# Patient Record
Sex: FEMALE | Race: WHITE | Employment: OTHER | URBAN - METROPOLITAN AREA
[De-identification: names, ages, dates, MRNs, and addresses within clinical notes are randomized per-mention and may not be internally consistent; named-entity substitution may affect disease eponyms.]

---

## 2019-08-16 ENCOUNTER — HOSPITAL ENCOUNTER (OUTPATIENT)
Dept: MRI IMAGING | Age: 59
Discharge: HOME | End: 2019-08-16
Attending: FAMILY MEDICINE
Payer: COMMERCIAL

## 2019-08-16 DIAGNOSIS — R41.89 THOUGHT DISORDER: ICD-10-CM

## 2019-08-16 DIAGNOSIS — F22 DELUSIONS (HCC): ICD-10-CM

## 2019-08-16 PROCEDURE — 70551 MRI BRAIN STEM W/O DYE: CPT

## 2019-08-21 ENCOUNTER — HOSPITAL ENCOUNTER (OUTPATIENT)
Dept: MAMMOGRAPHY | Age: 59
Discharge: HOME | End: 2019-08-21
Attending: FAMILY MEDICINE
Payer: COMMERCIAL

## 2019-08-21 DIAGNOSIS — Z12.39 ENCOUNTER FOR SCREENING FOR MALIGNANT NEOPLASM OF BREAST: ICD-10-CM

## 2019-08-21 PROCEDURE — 77067 SCR MAMMO BI INCL CAD: CPT

## 2019-09-17 ENCOUNTER — HOSPITAL ENCOUNTER (OUTPATIENT)
Dept: MAMMOGRAPHY | Age: 59
Discharge: HOME | End: 2019-09-17
Attending: FAMILY MEDICINE
Payer: COMMERCIAL

## 2019-09-17 DIAGNOSIS — R92.8 ABNORMAL SCREENING MAMMOGRAM: ICD-10-CM

## 2019-09-17 PROCEDURE — 77066 DX MAMMO INCL CAD BI: CPT

## 2019-09-25 ENCOUNTER — HOSPITAL ENCOUNTER (OUTPATIENT)
Dept: MAMMOGRAPHY | Age: 59
Discharge: HOME | End: 2019-09-25
Attending: FAMILY MEDICINE
Payer: COMMERCIAL

## 2019-09-25 DIAGNOSIS — R92.1 BREAST CALCIFICATION, RIGHT: ICD-10-CM

## 2019-09-25 DIAGNOSIS — R92.1 BREAST CALCIFICATION, LEFT: ICD-10-CM

## 2019-09-25 PROCEDURE — 74011000250 HC RX REV CODE- 250: Performed by: FAMILY MEDICINE

## 2019-09-25 PROCEDURE — 19081 BX BREAST 1ST LESION STRTCTC: CPT

## 2019-09-25 PROCEDURE — 74011250636 HC RX REV CODE- 250/636: Performed by: FAMILY MEDICINE

## 2019-09-25 PROCEDURE — 88305 TISSUE EXAM BY PATHOLOGIST: CPT

## 2019-09-25 PROCEDURE — 77066 DX MAMMO INCL CAD BI: CPT

## 2019-09-25 RX ORDER — LIDOCAINE HYDROCHLORIDE 10 MG/ML
5 INJECTION INFILTRATION; PERINEURAL
Status: COMPLETED | OUTPATIENT
Start: 2019-09-25 | End: 2019-09-25

## 2019-09-25 RX ORDER — LIDOCAINE HYDROCHLORIDE AND EPINEPHRINE 10; 10 MG/ML; UG/ML
5 INJECTION, SOLUTION INFILTRATION; PERINEURAL
Status: COMPLETED | OUTPATIENT
Start: 2019-09-25 | End: 2019-09-25

## 2019-09-25 RX ADMIN — LIDOCAINE HYDROCHLORIDE AND EPINEPHRINE 5 ML: 10; 10 INJECTION, SOLUTION INFILTRATION; PERINEURAL at 12:42

## 2019-09-25 RX ADMIN — SODIUM CHLORIDE 250 ML: 900 INJECTION, SOLUTION INTRAVENOUS at 12:40

## 2019-09-25 RX ADMIN — LIDOCAINE HYDROCHLORIDE 1 ML: 10 INJECTION, SOLUTION INFILTRATION; PERINEURAL at 12:43

## 2019-09-25 RX ADMIN — SODIUM CHLORIDE 250 ML: 900 INJECTION, SOLUTION INTRAVENOUS at 12:42

## 2019-09-25 RX ADMIN — LIDOCAINE HYDROCHLORIDE AND EPINEPHRINE 5 ML: 10; 10 INJECTION, SOLUTION INFILTRATION; PERINEURAL at 12:41

## 2019-09-25 RX ADMIN — LIDOCAINE HYDROCHLORIDE 1 ML: 10 INJECTION, SOLUTION INFILTRATION; PERINEURAL at 12:39

## 2019-09-27 NOTE — PROGRESS NOTES
The patient and her  returned to the breast center to discuss the results from her recent bilateral breast biopsies, the pathology came back as RT & LT ADH. The patient will  Have a bilateral breast MRI and then follow up with Dr. Mandi Benitez on [id number]. The patient did receive a packet of information that includes her pathology, her appointments and the contatc information in case the patient has any further questions.

## 2019-10-02 ENCOUNTER — HOSPITAL ENCOUNTER (OUTPATIENT)
Dept: MRI IMAGING | Age: 59
Discharge: HOME | End: 2019-10-02
Attending: FAMILY MEDICINE
Payer: COMMERCIAL

## 2019-10-02 DIAGNOSIS — N60.99 ATYPICAL HYPERPLASIA OF BREAST: ICD-10-CM

## 2019-10-02 PROCEDURE — 74011250636 HC RX REV CODE- 250/636: Performed by: FAMILY MEDICINE

## 2019-10-02 PROCEDURE — 77049 MRI BREAST C-+ W/CAD BI: CPT

## 2019-10-02 PROCEDURE — 74011000258 HC RX REV CODE- 258: Performed by: FAMILY MEDICINE

## 2019-10-02 PROCEDURE — A9575 INJ GADOTERATE MEGLUMI 0.1ML: HCPCS | Performed by: FAMILY MEDICINE

## 2019-10-02 RX ORDER — GADOTERATE MEGLUMINE 376.9 MG/ML
13 INJECTION INTRAVENOUS
Status: COMPLETED | OUTPATIENT
Start: 2019-10-02 | End: 2019-10-02

## 2019-10-02 RX ORDER — SODIUM CHLORIDE 0.9 % (FLUSH) 0.9 %
10 SYRINGE (ML) INJECTION
Status: COMPLETED | OUTPATIENT
Start: 2019-10-02 | End: 2019-10-02

## 2019-10-02 RX ADMIN — SODIUM CHLORIDE 100 ML: 900 INJECTION, SOLUTION INTRAVENOUS at 09:54

## 2019-10-02 RX ADMIN — GADOTERATE MEGLUMINE 13 ML: 376.9 INJECTION INTRAVENOUS at 09:54

## 2019-10-02 RX ADMIN — Medication 10 ML: at 09:54

## 2019-10-30 ENCOUNTER — HOSPITAL ENCOUNTER (OUTPATIENT)
Dept: LAB | Age: 59
Discharge: HOME | End: 2019-10-30
Payer: COMMERCIAL

## 2019-10-30 DIAGNOSIS — N60.99 ATYPICAL HYPERPLASIA OF BREAST: ICD-10-CM

## 2019-10-30 LAB
ALBUMIN SERPL-MCNC: 4.2 G/DL (ref 3.2–4.6)
ALBUMIN/GLOB SERPL: 0.8 {RATIO} (ref 1.2–3.5)
ALP SERPL-CCNC: 77 U/L (ref 50–136)
ALT SERPL-CCNC: 57 U/L (ref 12–65)
ANION GAP SERPL CALC-SCNC: 8 MMOL/L (ref 7–16)
AST SERPL-CCNC: 51 U/L (ref 15–37)
BASOPHILS # BLD: 0 K/UL (ref 0–0.2)
BASOPHILS NFR BLD: 1 % (ref 0–2)
BILIRUB SERPL-MCNC: 0.4 MG/DL (ref 0.2–1.1)
BUN SERPL-MCNC: 5 MG/DL (ref 8–23)
CALCIUM SERPL-MCNC: 9.2 MG/DL (ref 8.3–10.4)
CHLORIDE SERPL-SCNC: 105 MMOL/L (ref 98–107)
CO2 SERPL-SCNC: 27 MMOL/L (ref 21–32)
CREAT SERPL-MCNC: 0.79 MG/DL (ref 0.6–1)
DIFFERENTIAL METHOD BLD: ABNORMAL
EOSINOPHIL # BLD: 0.1 K/UL (ref 0–0.8)
EOSINOPHIL NFR BLD: 1 % (ref 0.5–7.8)
ERYTHROCYTE [DISTWIDTH] IN BLOOD BY AUTOMATED COUNT: 15 % (ref 11.9–14.6)
GLOBULIN SER CALC-MCNC: 5.4 G/DL (ref 2.3–3.5)
GLUCOSE SERPL-MCNC: 70 MG/DL (ref 65–100)
HCT VFR BLD AUTO: 40.8 % (ref 35.8–46.3)
HGB BLD-MCNC: 13.5 G/DL (ref 11.7–15.4)
IMM GRANULOCYTES # BLD AUTO: 0 K/UL (ref 0–0.5)
IMM GRANULOCYTES NFR BLD AUTO: 0 % (ref 0–5)
LYMPHOCYTES # BLD: 3.4 K/UL (ref 0.5–4.6)
LYMPHOCYTES NFR BLD: 44 % (ref 13–44)
MCH RBC QN AUTO: 28.5 PG (ref 26.1–32.9)
MCHC RBC AUTO-ENTMCNC: 33.1 G/DL (ref 31.4–35)
MCV RBC AUTO: 86.1 FL (ref 79.6–97.8)
MONOCYTES # BLD: 0.5 K/UL (ref 0.1–1.3)
MONOCYTES NFR BLD: 7 % (ref 4–12)
NEUTS SEG # BLD: 3.7 K/UL (ref 1.7–8.2)
NEUTS SEG NFR BLD: 48 % (ref 43–78)
NRBC # BLD: 0 K/UL (ref 0–0.2)
PLATELET # BLD AUTO: 279 K/UL (ref 150–450)
PMV BLD AUTO: 10.8 FL (ref 9.4–12.3)
POTASSIUM SERPL-SCNC: 2.9 MMOL/L (ref 3.5–5.1)
PROT SERPL-MCNC: 9.6 G/DL (ref 6.3–8.2)
RBC # BLD AUTO: 4.74 M/UL (ref 4.05–5.25)
SODIUM SERPL-SCNC: 140 MMOL/L (ref 136–145)
WBC # BLD AUTO: 7.7 K/UL (ref 4.3–11.1)

## 2019-10-30 PROCEDURE — 85025 COMPLETE CBC W/AUTO DIFF WBC: CPT

## 2019-10-30 PROCEDURE — 80053 COMPREHEN METABOLIC PANEL: CPT

## 2019-10-30 PROCEDURE — 36415 COLL VENOUS BLD VENIPUNCTURE: CPT

## 2019-11-11 PROBLEM — N60.92 ATYPICAL DUCTAL HYPERPLASIA OF BOTH BREASTS: Status: ACTIVE | Noted: 2019-11-11

## 2019-11-11 PROBLEM — N60.91 ATYPICAL DUCTAL HYPERPLASIA OF BOTH BREASTS: Status: ACTIVE | Noted: 2019-11-11

## 2019-12-26 ENCOUNTER — HOSPITAL ENCOUNTER (OUTPATIENT)
Dept: MAMMOGRAPHY | Age: 59
Discharge: HOME | End: 2019-12-26
Attending: INTERNAL MEDICINE
Payer: COMMERCIAL

## 2019-12-26 DIAGNOSIS — M81.0 OSTEOPOROSIS, UNSPECIFIED OSTEOPOROSIS TYPE, UNSPECIFIED PATHOLOGICAL FRACTURE PRESENCE: ICD-10-CM

## 2019-12-26 PROCEDURE — 77080 DXA BONE DENSITY AXIAL: CPT

## 2020-02-26 ENCOUNTER — HOSPITAL ENCOUNTER (OUTPATIENT)
Dept: MAMMOGRAPHY | Age: 60
Discharge: HOME | End: 2020-02-26
Attending: INTERNAL MEDICINE
Payer: COMMERCIAL

## 2020-02-26 DIAGNOSIS — N60.92 ATYPICAL DUCTAL HYPERPLASIA OF BOTH BREASTS: ICD-10-CM

## 2020-02-26 DIAGNOSIS — N60.91 ATYPICAL DUCTAL HYPERPLASIA OF BOTH BREASTS: ICD-10-CM

## 2020-02-26 PROCEDURE — 77066 DX MAMMO INCL CAD BI: CPT

## 2020-03-04 ENCOUNTER — HOSPITAL ENCOUNTER (OUTPATIENT)
Dept: LAB | Age: 60
Discharge: HOME | End: 2020-03-04
Payer: COMMERCIAL

## 2020-03-04 DIAGNOSIS — N60.92 ATYPICAL DUCTAL HYPERPLASIA OF BOTH BREASTS: ICD-10-CM

## 2020-03-04 DIAGNOSIS — N60.91 ATYPICAL DUCTAL HYPERPLASIA OF BOTH BREASTS: ICD-10-CM

## 2020-03-04 LAB
ALBUMIN SERPL-MCNC: 3.8 G/DL (ref 3.2–4.6)
ALBUMIN/GLOB SERPL: 0.7 {RATIO} (ref 1.2–3.5)
ALP SERPL-CCNC: 82 U/L (ref 50–136)
ALT SERPL-CCNC: 97 U/L (ref 12–65)
ANION GAP SERPL CALC-SCNC: 8 MMOL/L (ref 7–16)
AST SERPL-CCNC: 85 U/L (ref 15–37)
BASOPHILS # BLD: 0 K/UL (ref 0–0.2)
BASOPHILS NFR BLD: 1 % (ref 0–2)
BILIRUB SERPL-MCNC: 0.7 MG/DL (ref 0.2–1.1)
BUN SERPL-MCNC: 8 MG/DL (ref 8–23)
CALCIUM SERPL-MCNC: 7.7 MG/DL (ref 8.3–10.4)
CHLORIDE SERPL-SCNC: 104 MMOL/L (ref 98–107)
CO2 SERPL-SCNC: 25 MMOL/L (ref 21–32)
CREAT SERPL-MCNC: 0.93 MG/DL (ref 0.6–1)
DIFFERENTIAL METHOD BLD: ABNORMAL
EOSINOPHIL # BLD: 0.1 K/UL (ref 0–0.8)
EOSINOPHIL NFR BLD: 1 % (ref 0.5–7.8)
ERYTHROCYTE [DISTWIDTH] IN BLOOD BY AUTOMATED COUNT: 15.1 % (ref 11.9–14.6)
GLOBULIN SER CALC-MCNC: 5.2 G/DL (ref 2.3–3.5)
GLUCOSE SERPL-MCNC: 107 MG/DL (ref 65–100)
HCT VFR BLD AUTO: 36.7 % (ref 35.8–46.3)
HGB BLD-MCNC: 12.7 G/DL (ref 11.7–15.4)
IMM GRANULOCYTES # BLD AUTO: 0 K/UL (ref 0–0.5)
IMM GRANULOCYTES NFR BLD AUTO: 0 % (ref 0–5)
LYMPHOCYTES # BLD: 2.6 K/UL (ref 0.5–4.6)
LYMPHOCYTES NFR BLD: 39 % (ref 13–44)
MCH RBC QN AUTO: 29.1 PG (ref 26.1–32.9)
MCHC RBC AUTO-ENTMCNC: 34.6 G/DL (ref 31.4–35)
MCV RBC AUTO: 84.2 FL (ref 79.6–97.8)
MONOCYTES # BLD: 0.5 K/UL (ref 0.1–1.3)
MONOCYTES NFR BLD: 7 % (ref 4–12)
NEUTS SEG # BLD: 3.5 K/UL (ref 1.7–8.2)
NEUTS SEG NFR BLD: 52 % (ref 43–78)
NRBC # BLD: 0 K/UL (ref 0–0.2)
PLATELET # BLD AUTO: 256 K/UL (ref 150–450)
PMV BLD AUTO: 10.5 FL (ref 9.4–12.3)
POTASSIUM SERPL-SCNC: 3.3 MMOL/L (ref 3.5–5.1)
PROT SERPL-MCNC: 9 G/DL (ref 6.3–8.2)
RBC # BLD AUTO: 4.36 M/UL (ref 4.05–5.25)
SODIUM SERPL-SCNC: 137 MMOL/L (ref 136–145)
WBC # BLD AUTO: 6.7 K/UL (ref 4.3–11.1)

## 2020-03-04 PROCEDURE — 36415 COLL VENOUS BLD VENIPUNCTURE: CPT

## 2020-03-04 PROCEDURE — 85025 COMPLETE CBC W/AUTO DIFF WBC: CPT

## 2020-03-04 PROCEDURE — 80053 COMPREHEN METABOLIC PANEL: CPT

## 2020-06-10 ENCOUNTER — HOSPITAL ENCOUNTER (OUTPATIENT)
Dept: LAB | Age: 60
Discharge: HOME | End: 2020-06-10
Payer: COMMERCIAL

## 2020-06-10 DIAGNOSIS — N60.92 ATYPICAL DUCTAL HYPERPLASIA OF BOTH BREASTS: ICD-10-CM

## 2020-06-10 DIAGNOSIS — N60.91 ATYPICAL DUCTAL HYPERPLASIA OF BOTH BREASTS: ICD-10-CM

## 2020-06-10 LAB
ALBUMIN SERPL-MCNC: 3.8 G/DL (ref 3.2–4.6)
ALBUMIN/GLOB SERPL: 0.7 {RATIO} (ref 1.2–3.5)
ALP SERPL-CCNC: 65 U/L (ref 50–136)
ALT SERPL-CCNC: 74 U/L (ref 12–65)
ANION GAP SERPL CALC-SCNC: 7 MMOL/L (ref 7–16)
AST SERPL-CCNC: 97 U/L (ref 15–37)
BASOPHILS # BLD: 0.1 K/UL (ref 0–0.2)
BASOPHILS NFR BLD: 1 % (ref 0–2)
BILIRUB SERPL-MCNC: 0.5 MG/DL (ref 0.2–1.1)
BUN SERPL-MCNC: 7 MG/DL (ref 8–23)
CALCIUM SERPL-MCNC: 8.9 MG/DL (ref 8.3–10.4)
CHLORIDE SERPL-SCNC: 100 MMOL/L (ref 98–107)
CHOLEST SERPL-MCNC: 228 MG/DL
CO2 SERPL-SCNC: 29 MMOL/L (ref 21–32)
CREAT SERPL-MCNC: 1 MG/DL (ref 0.6–1)
DIFFERENTIAL METHOD BLD: ABNORMAL
EOSINOPHIL # BLD: 0.1 K/UL (ref 0–0.8)
EOSINOPHIL NFR BLD: 1 % (ref 0.5–7.8)
ERYTHROCYTE [DISTWIDTH] IN BLOOD BY AUTOMATED COUNT: 15 % (ref 11.9–14.6)
GLOBULIN SER CALC-MCNC: 5.5 G/DL (ref 2.3–3.5)
GLUCOSE SERPL-MCNC: 128 MG/DL (ref 65–100)
HCT VFR BLD AUTO: 37.5 % (ref 35.8–46.3)
HDLC SERPL-MCNC: 53 MG/DL (ref 40–60)
HDLC SERPL: 4.3 {RATIO}
HGB BLD-MCNC: 12.5 G/DL (ref 11.7–15.4)
IMM GRANULOCYTES # BLD AUTO: 0 K/UL (ref 0–0.5)
IMM GRANULOCYTES NFR BLD AUTO: 0 % (ref 0–5)
LDLC SERPL CALC-MCNC: 138 MG/DL
LIPID PROFILE,FLP: ABNORMAL
LYMPHOCYTES # BLD: 2.9 K/UL (ref 0.5–4.6)
LYMPHOCYTES NFR BLD: 46 % (ref 13–44)
MCH RBC QN AUTO: 28.6 PG (ref 26.1–32.9)
MCHC RBC AUTO-ENTMCNC: 33.3 G/DL (ref 31.4–35)
MCV RBC AUTO: 85.8 FL (ref 79.6–97.8)
MONOCYTES # BLD: 0.4 K/UL (ref 0.1–1.3)
MONOCYTES NFR BLD: 7 % (ref 4–12)
NEUTS SEG # BLD: 2.9 K/UL (ref 1.7–8.2)
NEUTS SEG NFR BLD: 46 % (ref 43–78)
NRBC # BLD: 0 K/UL (ref 0–0.2)
PLATELET # BLD AUTO: 267 K/UL (ref 150–450)
PMV BLD AUTO: 10.9 FL (ref 9.4–12.3)
POTASSIUM SERPL-SCNC: 3.6 MMOL/L (ref 3.5–5.1)
PROT SERPL-MCNC: 9.3 G/DL (ref 6.3–8.2)
RBC # BLD AUTO: 4.37 M/UL (ref 4.05–5.25)
SODIUM SERPL-SCNC: 136 MMOL/L (ref 136–145)
TRIGL SERPL-MCNC: 185 MG/DL (ref 35–150)
VLDLC SERPL CALC-MCNC: 37 MG/DL (ref 6–23)
WBC # BLD AUTO: 6.3 K/UL (ref 4.3–11.1)

## 2020-06-10 PROCEDURE — 85025 COMPLETE CBC W/AUTO DIFF WBC: CPT

## 2020-06-10 PROCEDURE — 80061 LIPID PANEL: CPT

## 2020-06-10 PROCEDURE — 80053 COMPREHEN METABOLIC PANEL: CPT

## 2020-06-10 PROCEDURE — 36415 COLL VENOUS BLD VENIPUNCTURE: CPT

## 2020-08-04 ENCOUNTER — HOSPITAL ENCOUNTER (OUTPATIENT)
Dept: MRI IMAGING | Age: 60
Discharge: HOME | End: 2020-08-04
Attending: NURSE PRACTITIONER
Payer: COMMERCIAL

## 2020-08-04 ENCOUNTER — HOSPITAL ENCOUNTER (OUTPATIENT)
Dept: MAMMOGRAPHY | Age: 60
Discharge: HOME | End: 2020-08-04
Attending: NURSE PRACTITIONER
Payer: COMMERCIAL

## 2020-08-04 DIAGNOSIS — N60.91 ATYPICAL DUCTAL HYPERPLASIA OF BOTH BREASTS: ICD-10-CM

## 2020-08-04 DIAGNOSIS — N60.92 ATYPICAL DUCTAL HYPERPLASIA OF BOTH BREASTS: ICD-10-CM

## 2020-08-04 PROCEDURE — 77049 MRI BREAST C-+ W/CAD BI: CPT

## 2020-08-04 PROCEDURE — A9575 INJ GADOTERATE MEGLUMI 0.1ML: HCPCS | Performed by: NURSE PRACTITIONER

## 2020-08-04 PROCEDURE — 77065 DX MAMMO INCL CAD UNI: CPT

## 2020-08-04 PROCEDURE — 74011000258 HC RX REV CODE- 258: Performed by: NURSE PRACTITIONER

## 2020-08-04 PROCEDURE — 74011250636 HC RX REV CODE- 250/636: Performed by: NURSE PRACTITIONER

## 2020-08-04 RX ORDER — GADOTERATE MEGLUMINE 376.9 MG/ML
13 INJECTION INTRAVENOUS
Status: COMPLETED | OUTPATIENT
Start: 2020-08-04 | End: 2020-08-04

## 2020-08-04 RX ADMIN — GADOTERATE MEGLUMINE 13 ML: 376.9 INJECTION INTRAVENOUS at 14:40

## 2020-08-04 RX ADMIN — SODIUM CHLORIDE 100 ML: 900 INJECTION, SOLUTION INTRAVENOUS at 14:40

## 2020-10-08 ENCOUNTER — HOSPITAL ENCOUNTER (OUTPATIENT)
Dept: LAB | Age: 60
Discharge: HOME | End: 2020-10-08

## 2020-10-08 DIAGNOSIS — N60.92 ATYPICAL DUCTAL HYPERPLASIA OF BOTH BREASTS: ICD-10-CM

## 2020-10-08 DIAGNOSIS — N60.91 ATYPICAL DUCTAL HYPERPLASIA OF BOTH BREASTS: ICD-10-CM

## 2020-10-08 LAB
ALBUMIN SERPL-MCNC: 3.7 G/DL (ref 3.2–4.6)
ALBUMIN/GLOB SERPL: 0.7 {RATIO} (ref 1.2–3.5)
ALP SERPL-CCNC: 60 U/L (ref 50–136)
ALT SERPL-CCNC: 80 U/L (ref 12–65)
ANION GAP SERPL CALC-SCNC: 8 MMOL/L (ref 7–16)
AST SERPL-CCNC: 81 U/L (ref 15–37)
BASOPHILS # BLD: 0 K/UL (ref 0–0.2)
BASOPHILS NFR BLD: 1 % (ref 0–2)
BILIRUB SERPL-MCNC: 0.4 MG/DL (ref 0.2–1.1)
BUN SERPL-MCNC: 11 MG/DL (ref 8–23)
CALCIUM SERPL-MCNC: 8 MG/DL (ref 8.3–10.4)
CHLORIDE SERPL-SCNC: 104 MMOL/L (ref 98–107)
CO2 SERPL-SCNC: 26 MMOL/L (ref 21–32)
CREAT SERPL-MCNC: 0.9 MG/DL (ref 0.6–1)
DIFFERENTIAL METHOD BLD: ABNORMAL
EOSINOPHIL # BLD: 0.1 K/UL (ref 0–0.8)
EOSINOPHIL NFR BLD: 1 % (ref 0.5–7.8)
ERYTHROCYTE [DISTWIDTH] IN BLOOD BY AUTOMATED COUNT: 15 % (ref 11.9–14.6)
GLOBULIN SER CALC-MCNC: 5.4 G/DL (ref 2.3–3.5)
GLUCOSE SERPL-MCNC: 111 MG/DL (ref 65–100)
HCT VFR BLD AUTO: 37.3 % (ref 35.8–46.3)
HGB BLD-MCNC: 12.4 G/DL (ref 11.7–15.4)
IMM GRANULOCYTES # BLD AUTO: 0 K/UL (ref 0–0.5)
IMM GRANULOCYTES NFR BLD AUTO: 0 % (ref 0–5)
LYMPHOCYTES # BLD: 3 K/UL (ref 0.5–4.6)
LYMPHOCYTES NFR BLD: 44 % (ref 13–44)
MCH RBC QN AUTO: 28.5 PG (ref 26.1–32.9)
MCHC RBC AUTO-ENTMCNC: 33.2 G/DL (ref 31.4–35)
MCV RBC AUTO: 85.7 FL (ref 79.6–97.8)
MONOCYTES # BLD: 0.4 K/UL (ref 0.1–1.3)
MONOCYTES NFR BLD: 6 % (ref 4–12)
NEUTS SEG # BLD: 3.3 K/UL (ref 1.7–8.2)
NEUTS SEG NFR BLD: 49 % (ref 43–78)
NRBC # BLD: 0 K/UL (ref 0–0.2)
PLATELET # BLD AUTO: 277 K/UL (ref 150–450)
PMV BLD AUTO: 11.2 FL (ref 9.4–12.3)
POTASSIUM SERPL-SCNC: 3.4 MMOL/L (ref 3.5–5.1)
PROT SERPL-MCNC: 9.1 G/DL (ref 6.3–8.2)
RBC # BLD AUTO: 4.35 M/UL (ref 4.05–5.25)
SODIUM SERPL-SCNC: 138 MMOL/L (ref 136–145)
WBC # BLD AUTO: 6.8 K/UL (ref 4.3–11.1)

## 2020-10-08 PROCEDURE — 36415 COLL VENOUS BLD VENIPUNCTURE: CPT

## 2020-10-08 PROCEDURE — 80053 COMPREHEN METABOLIC PANEL: CPT

## 2020-10-08 PROCEDURE — 85025 COMPLETE CBC W/AUTO DIFF WBC: CPT

## 2020-11-12 ENCOUNTER — TRANSCRIBE ORDER (OUTPATIENT)
Dept: SCHEDULING | Age: 60
End: 2020-11-12

## 2020-11-12 DIAGNOSIS — Z85.3 HX: BREAST CANCER: ICD-10-CM

## 2020-11-12 DIAGNOSIS — R41.0 CONFUSION: Primary | ICD-10-CM

## 2020-11-23 ENCOUNTER — HOSPITAL ENCOUNTER (OUTPATIENT)
Dept: MRI IMAGING | Age: 60
Discharge: HOME | End: 2020-11-23
Attending: FAMILY MEDICINE
Payer: COMMERCIAL

## 2020-11-23 DIAGNOSIS — R41.0 CONFUSION: ICD-10-CM

## 2020-11-23 DIAGNOSIS — Z85.3 HX: BREAST CANCER: ICD-10-CM

## 2020-11-23 PROCEDURE — 70553 MRI BRAIN STEM W/O & W/DYE: CPT

## 2020-11-23 PROCEDURE — A9575 INJ GADOTERATE MEGLUMI 0.1ML: HCPCS | Performed by: FAMILY MEDICINE

## 2020-11-23 PROCEDURE — 74011250636 HC RX REV CODE- 250/636: Performed by: FAMILY MEDICINE

## 2020-11-23 RX ORDER — SODIUM CHLORIDE 0.9 % (FLUSH) 0.9 %
10 SYRINGE (ML) INJECTION
Status: COMPLETED | OUTPATIENT
Start: 2020-11-23 | End: 2020-11-23

## 2020-11-23 RX ORDER — GADOTERATE MEGLUMINE 376.9 MG/ML
14 INJECTION INTRAVENOUS
Status: COMPLETED | OUTPATIENT
Start: 2020-11-23 | End: 2020-11-23

## 2020-11-23 RX ADMIN — GADOTERATE MEGLUMINE 14 ML: 376.9 INJECTION INTRAVENOUS at 09:56

## 2020-11-23 RX ADMIN — Medication 10 ML: at 09:56

## 2021-02-08 ENCOUNTER — HOSPITAL ENCOUNTER (OUTPATIENT)
Dept: LAB | Age: 61
Discharge: HOME | End: 2021-02-08
Payer: COMMERCIAL

## 2021-02-08 DIAGNOSIS — N60.92 ATYPICAL DUCTAL HYPERPLASIA OF BOTH BREASTS: ICD-10-CM

## 2021-02-08 DIAGNOSIS — N60.91 ATYPICAL DUCTAL HYPERPLASIA OF BOTH BREASTS: ICD-10-CM

## 2021-02-08 LAB
ALBUMIN SERPL-MCNC: 3.7 G/DL (ref 3.2–4.6)
ALBUMIN/GLOB SERPL: 0.7 {RATIO} (ref 1.2–3.5)
ALP SERPL-CCNC: 64 U/L (ref 50–136)
ALT SERPL-CCNC: 58 U/L (ref 12–65)
ANION GAP SERPL CALC-SCNC: 5 MMOL/L (ref 7–16)
AST SERPL-CCNC: 67 U/L (ref 15–37)
BASOPHILS # BLD: 0 K/UL (ref 0–0.2)
BASOPHILS NFR BLD: 1 % (ref 0–2)
BILIRUB SERPL-MCNC: 0.3 MG/DL (ref 0.2–1.1)
BUN SERPL-MCNC: 13 MG/DL (ref 8–23)
CALCIUM SERPL-MCNC: 9.4 MG/DL (ref 8.3–10.4)
CHLORIDE SERPL-SCNC: 101 MMOL/L (ref 98–107)
CO2 SERPL-SCNC: 29 MMOL/L (ref 21–32)
CREAT SERPL-MCNC: 0.9 MG/DL (ref 0.6–1)
DIFFERENTIAL METHOD BLD: ABNORMAL
EOSINOPHIL # BLD: 0.1 K/UL (ref 0–0.8)
EOSINOPHIL NFR BLD: 2 % (ref 0.5–7.8)
ERYTHROCYTE [DISTWIDTH] IN BLOOD BY AUTOMATED COUNT: 14.6 % (ref 11.9–14.6)
GLOBULIN SER CALC-MCNC: 5.3 G/DL (ref 2.3–3.5)
GLUCOSE SERPL-MCNC: 284 MG/DL (ref 65–100)
HCT VFR BLD AUTO: 35.6 % (ref 35.8–46.3)
HGB BLD-MCNC: 11.6 G/DL (ref 11.7–15.4)
IMM GRANULOCYTES # BLD AUTO: 0 K/UL (ref 0–0.5)
IMM GRANULOCYTES NFR BLD AUTO: 0 % (ref 0–5)
LYMPHOCYTES # BLD: 2.3 K/UL (ref 0.5–4.6)
LYMPHOCYTES NFR BLD: 41 % (ref 13–44)
MCH RBC QN AUTO: 27.5 PG (ref 26.1–32.9)
MCHC RBC AUTO-ENTMCNC: 32.6 G/DL (ref 31.4–35)
MCV RBC AUTO: 84.4 FL (ref 79.6–97.8)
MONOCYTES # BLD: 0.4 K/UL (ref 0.1–1.3)
MONOCYTES NFR BLD: 7 % (ref 4–12)
NEUTS SEG # BLD: 2.7 K/UL (ref 1.7–8.2)
NEUTS SEG NFR BLD: 50 % (ref 43–78)
NRBC # BLD: 0 K/UL (ref 0–0.2)
PLATELET # BLD AUTO: 253 K/UL (ref 150–450)
PMV BLD AUTO: 11.3 FL (ref 9.4–12.3)
POTASSIUM SERPL-SCNC: 3.5 MMOL/L (ref 3.5–5.1)
PROT SERPL-MCNC: 9 G/DL (ref 6.3–8.2)
RBC # BLD AUTO: 4.22 M/UL (ref 4.05–5.25)
SODIUM SERPL-SCNC: 135 MMOL/L (ref 136–145)
WBC # BLD AUTO: 5.5 K/UL (ref 4.3–11.1)

## 2021-02-08 PROCEDURE — 80053 COMPREHEN METABOLIC PANEL: CPT

## 2021-02-08 PROCEDURE — 36415 COLL VENOUS BLD VENIPUNCTURE: CPT

## 2021-02-08 PROCEDURE — 85025 COMPLETE CBC W/AUTO DIFF WBC: CPT

## 2021-03-01 ENCOUNTER — HOSPITAL ENCOUNTER (OUTPATIENT)
Dept: MAMMOGRAPHY | Age: 61
Discharge: HOME | End: 2021-03-01
Attending: INTERNAL MEDICINE
Payer: COMMERCIAL

## 2021-03-01 DIAGNOSIS — Z12.31 SCREENING MAMMOGRAM, ENCOUNTER FOR: ICD-10-CM

## 2021-03-01 PROCEDURE — 77067 SCR MAMMO BI INCL CAD: CPT

## 2021-03-08 ENCOUNTER — HOSPITAL ENCOUNTER (OUTPATIENT)
Dept: ULTRASOUND IMAGING | Age: 61
Discharge: HOME | End: 2021-03-08
Attending: NURSE PRACTITIONER
Payer: COMMERCIAL

## 2021-03-08 ENCOUNTER — HOSPITAL ENCOUNTER (OUTPATIENT)
Dept: LAB | Age: 61
Discharge: HOME | End: 2021-03-08
Payer: COMMERCIAL

## 2021-03-08 DIAGNOSIS — Z85.3 HX: BREAST CANCER: ICD-10-CM

## 2021-03-08 DIAGNOSIS — M79.602 DIFFUSE PAIN IN LEFT UPPER EXTREMITY: ICD-10-CM

## 2021-03-08 DIAGNOSIS — M79.89 SWELLING OF LEFT UPPER EXTREMITY: ICD-10-CM

## 2021-03-08 LAB
ALBUMIN SERPL-MCNC: 3.6 G/DL (ref 3.2–4.6)
ALBUMIN/GLOB SERPL: 0.6 {RATIO} (ref 1.2–3.5)
ALP SERPL-CCNC: 60 U/L (ref 50–136)
ALT SERPL-CCNC: 56 U/L (ref 12–65)
ANION GAP SERPL CALC-SCNC: 7 MMOL/L (ref 7–16)
AST SERPL-CCNC: 67 U/L (ref 15–37)
BASOPHILS # BLD: 0.1 K/UL (ref 0–0.2)
BASOPHILS NFR BLD: 1 % (ref 0–2)
BILIRUB SERPL-MCNC: 0.4 MG/DL (ref 0.2–1.1)
BUN SERPL-MCNC: 11 MG/DL (ref 8–23)
CALCIUM SERPL-MCNC: 9.3 MG/DL (ref 8.3–10.4)
CHLORIDE SERPL-SCNC: 95 MMOL/L (ref 98–107)
CO2 SERPL-SCNC: 28 MMOL/L (ref 21–32)
CREAT SERPL-MCNC: 0.8 MG/DL (ref 0.6–1)
DIFFERENTIAL METHOD BLD: ABNORMAL
EOSINOPHIL # BLD: 0.1 K/UL (ref 0–0.8)
EOSINOPHIL NFR BLD: 2 % (ref 0.5–7.8)
ERYTHROCYTE [DISTWIDTH] IN BLOOD BY AUTOMATED COUNT: 14.6 % (ref 11.9–14.6)
GLOBULIN SER CALC-MCNC: 5.6 G/DL (ref 2.3–3.5)
GLUCOSE SERPL-MCNC: 277 MG/DL (ref 65–100)
HCT VFR BLD AUTO: 35.3 % (ref 35.8–46.3)
HGB BLD-MCNC: 11.4 G/DL (ref 11.7–15.4)
IMM GRANULOCYTES # BLD AUTO: 0 K/UL (ref 0–0.5)
IMM GRANULOCYTES NFR BLD AUTO: 1 % (ref 0–5)
LYMPHOCYTES # BLD: 2.8 K/UL (ref 0.5–4.6)
LYMPHOCYTES NFR BLD: 34 % (ref 13–44)
MCH RBC QN AUTO: 26.9 PG (ref 26.1–32.9)
MCHC RBC AUTO-ENTMCNC: 32.3 G/DL (ref 31.4–35)
MCV RBC AUTO: 83.3 FL (ref 79.6–97.8)
MONOCYTES # BLD: 0.6 K/UL (ref 0.1–1.3)
MONOCYTES NFR BLD: 8 % (ref 4–12)
NEUTS SEG # BLD: 4.5 K/UL (ref 1.7–8.2)
NEUTS SEG NFR BLD: 56 % (ref 43–78)
NRBC # BLD: 0 K/UL (ref 0–0.2)
PLATELET # BLD AUTO: 254 K/UL (ref 150–450)
PMV BLD AUTO: 11.2 FL (ref 9.4–12.3)
POTASSIUM SERPL-SCNC: 3.4 MMOL/L (ref 3.5–5.1)
PROT SERPL-MCNC: 9.2 G/DL (ref 6.3–8.2)
RBC # BLD AUTO: 4.24 M/UL (ref 4.05–5.25)
SODIUM SERPL-SCNC: 130 MMOL/L (ref 136–145)
WBC # BLD AUTO: 8.1 K/UL (ref 4.3–11.1)

## 2021-03-08 PROCEDURE — 36415 COLL VENOUS BLD VENIPUNCTURE: CPT

## 2021-03-08 PROCEDURE — 85025 COMPLETE CBC W/AUTO DIFF WBC: CPT

## 2021-03-08 PROCEDURE — 80053 COMPREHEN METABOLIC PANEL: CPT

## 2021-03-08 PROCEDURE — 93971 EXTREMITY STUDY: CPT

## 2021-07-11 NOTE — H&P
[facility]  History and physical     Subjective  Problem List:    1) Left shoulder pain      This 57year old  presents today for an evaluation of  left shoulder pain. The patient comes in for evaluation, history and physical, and surgical consent signing. Patient states that she just got out of the hospital with a viral infection on 7/4/2021. She states that she is feeling much better. The surgical procedure was reviewed in detail with the patient. The risks, including but not limited to anesthesia, infection, deep vein thrombosis, pulmonary embolus, injury to vessels, tendons and nerves, paralysis, stroke, heart attack, loss of limb, and death were discussed. The patient understands the postoperative course and all questions were answered. No guarantees are made and all alternatives are given. The patient wishes to proceed with the surgery. Appropriate literature and relevant material were reviewed with the patient. Surgical procedure: . Left Shoulder Arthroscopy RCR, Jersey, and SAD    The patient presents today ambulatory without assistance. . The patient is unaccompanied today. .   Family health history: heart disease-grandparent, diabetes-grandparent, hypertension-mother, arthritis-grandparent. Major events: rt humerus surgery. Ongoing medical problems: acid reflux, diabetes, joint pain, muscle pain, osteoarthritis, osteoporosis. Preventive care: PCP: Dr. Moralez . Social history: Patient denies tobacco and EtOH use. Patient is . Objective  The patient is a 57year-old-female who appears her given age and is in no apparent distress. Oriented to person, place and time. Mood and affect are normal and appropriate to the situation. Assessment of respiratory effort reveals even and nonlabored respirations. The patient has a reciprocal gait and is able to get on and off the table without assistance. GEN: NAD.      Vital signs:  Ht:62 in  Wt: 142 lb  BMI: 25.97  BP: 142/90 mmHg  Temp: 98.6 °F  HR: 96 bpm  )2 sat: 97 %     The lungs clear to auscultation bilaterally. Heart rate regular without murmur heard to auscultation. Left Shoulder MRI (performed on 6/9/21) Impression:       Severe supraspinatus tendinopathy with a large full-thickness tear. Moderate distal and anterior infraspinatus tendinopathy. Supraspinatus calcific tendinobursitis with calcified deposits within the supraspinatus tendon and within the subacromial - subdeltoid bursa. Subacromial - subdeltoid bursitis. Likely high-grade partial-thickness tear of the arcuate segment of the long head biceps tendon. Favor degree of glenohumeral joint capsulosynovitis. Left Shoulder Examination:     Inspection reveals no external signs of injury or acute trauma. The palpation of the shoulder reveals tenderness to . Shoulder forward flexion (active):  80 degrees, with minimal pain     Shoulder forward flexion (passive): 60 degrees with minimal pain    Shoulder abduction (active): 60 degrees, with minimal  pain     Shoulder abduction (passive): full  minimal pain    Internal rotation: to the level of  L5 with pain     The muscle tone is normal.     The muscle strength 5/5     Vascular: Peripheral pulses normal 2/2 upper extremities. Neurologic: Sensation is intact and symmetrical in all dermatomes upper extremities. Assessment    DIAGNOSIS:        Pain in left shoulder [ICD-10: M25.512], [ICD-9: 719.41], [SNOMED: 54063336130122505]        Calcific tendinitis of left shoulder [ICD-10: M75.32], [ICD-9: 726.11], [ICD-9: 726.2], [SNOMED: 189176336536083]        Rupture of left rotator cuff [ICD-10: M75.102], [SNOMED: 22853296812339722]  Plan  We discussed the pathophysiology of the diagnosis and options for treatment. .     We discussed the surgical option(s) (Right shoulder arthroscopy with possible  RCR, Jersey, and SAD). We discussed surgical risks.  We have talked about the possibility of complications of surgical procedure, including the possibility of damage to nerves, arteries, vessels, and tendons, bleeding, infection, the possibility that the patient may sustain medical problems, even death. We have talked about the possibility that the condition may not improve after surgery or that it could actually be worse. The patient seems to understand and accept these possible complications. [facility]    All questions answered at this time. The patient knows to contact the office with any questions or concerns. .     VERIFICATION OF ANCILLARY DOCUMENTATION:  The portions of the chart completed by ancillary personnel were reviewed by the physician. .     RTC:.  Post-op    Current Medication;   Acetaminophen (Tylenol) 325 MG Oral Tablet One by mouth every 4 hours as needed for pain   Amlodipine Besylate (amLODIPine Besylate) 10 MG Oral One by mouth daily   Cholecalciferol (Vitamin D) 50 MCG (2000 UT) Oral Tablet by mouth daily   Gabapentin 100 MG Oral Capsule One by mouth TID as needed  Metformin HCl (metFORMIN HCl) 500 MG Oral Tablet by mouth daily   Olanzapine (OLANZapine) 7.5 MG Oral Tablet by mouth daily

## 2021-07-12 ENCOUNTER — ANESTHESIA EVENT (OUTPATIENT)
Dept: SURGERY | Age: 61
End: 2021-07-12
Payer: COMMERCIAL

## 2021-07-13 ENCOUNTER — ANESTHESIA (OUTPATIENT)
Dept: SURGERY | Age: 61
End: 2021-07-13
Payer: COMMERCIAL

## 2021-07-13 ENCOUNTER — HOSPITAL ENCOUNTER (OUTPATIENT)
Age: 61
Discharge: HOME | End: 2021-07-13
Attending: ORTHOPAEDIC SURGERY | Admitting: ORTHOPAEDIC SURGERY
Payer: COMMERCIAL

## 2021-07-13 VITALS
OXYGEN SATURATION: 94 % | BODY MASS INDEX: 21.11 KG/M2 | SYSTOLIC BLOOD PRESSURE: 141 MMHG | WEIGHT: 123 LBS | DIASTOLIC BLOOD PRESSURE: 66 MMHG | HEART RATE: 88 BPM | TEMPERATURE: 98 F | RESPIRATION RATE: 16 BRPM

## 2021-07-13 PROBLEM — M25.512 LEFT SHOULDER PAIN: Status: ACTIVE | Noted: 2021-07-13

## 2021-07-13 PROBLEM — M75.122 COMPLETE ROTATOR CUFF TEAR OF LEFT SHOULDER: Status: ACTIVE | Noted: 2021-07-13

## 2021-07-13 LAB
GLUCOSE BLD STRIP.AUTO-MCNC: 126 MG/DL (ref 65–100)
SERVICE CMNT-IMP: ABNORMAL

## 2021-07-13 PROCEDURE — 74011250636 HC RX REV CODE- 250/636: Performed by: ANESTHESIOLOGY

## 2021-07-13 PROCEDURE — 74011250636 HC RX REV CODE- 250/636: Performed by: NURSE ANESTHETIST, CERTIFIED REGISTERED

## 2021-07-13 PROCEDURE — 77030019908 HC STETH ESOPH SIMS -A: Performed by: NURSE ANESTHETIST, CERTIFIED REGISTERED

## 2021-07-13 PROCEDURE — 76060000035 HC ANESTHESIA 2 TO 2.5 HR: Performed by: ORTHOPAEDIC SURGERY

## 2021-07-13 PROCEDURE — 77030003915: Performed by: ORTHOPAEDIC SURGERY

## 2021-07-13 PROCEDURE — 2709999900 HC NON-CHARGEABLE SUPPLY: Performed by: ORTHOPAEDIC SURGERY

## 2021-07-13 PROCEDURE — 74011250637 HC RX REV CODE- 250/637: Performed by: ORTHOPAEDIC SURGERY

## 2021-07-13 PROCEDURE — 76010010054 HC POST OP PAIN BLOCK: Performed by: ORTHOPAEDIC SURGERY

## 2021-07-13 PROCEDURE — 77030027385 HC BLD SHV ARTHSCP STRY -B: Performed by: ORTHOPAEDIC SURGERY

## 2021-07-13 PROCEDURE — 74011000250 HC RX REV CODE- 250: Performed by: ANESTHESIOLOGY

## 2021-07-13 PROCEDURE — 77030040922 HC BLNKT HYPOTHRM STRY -A: Performed by: NURSE ANESTHETIST, CERTIFIED REGISTERED

## 2021-07-13 PROCEDURE — 77030008462 HC STPLR SKN PROX J&J -A: Performed by: ORTHOPAEDIC SURGERY

## 2021-07-13 PROCEDURE — 77030006668 HC BLD SHV MENSCS STRY -B: Performed by: ORTHOPAEDIC SURGERY

## 2021-07-13 PROCEDURE — 77030036826 HC NDL SUT PASS QUATTRO CM-9011 ZIMM -C: Performed by: ORTHOPAEDIC SURGERY

## 2021-07-13 PROCEDURE — 74011000250 HC RX REV CODE- 250: Performed by: NURSE ANESTHETIST, CERTIFIED REGISTERED

## 2021-07-13 PROCEDURE — 77030040361 HC SLV COMPR DVT MDII -B: Performed by: ORTHOPAEDIC SURGERY

## 2021-07-13 PROCEDURE — 82962 GLUCOSE BLOOD TEST: CPT

## 2021-07-13 PROCEDURE — 77030004453 HC BUR SHV STRY -B: Performed by: ORTHOPAEDIC SURGERY

## 2021-07-13 PROCEDURE — 77030003690 HC NDL TAPR ASPN -A: Performed by: ORTHOPAEDIC SURGERY

## 2021-07-13 PROCEDURE — 76210000020 HC REC RM PH II FIRST 0.5 HR: Performed by: ORTHOPAEDIC SURGERY

## 2021-07-13 PROCEDURE — 76942 ECHO GUIDE FOR BIOPSY: CPT | Performed by: ORTHOPAEDIC SURGERY

## 2021-07-13 PROCEDURE — C1713 ANCHOR/SCREW BN/BN,TIS/BN: HCPCS | Performed by: ORTHOPAEDIC SURGERY

## 2021-07-13 PROCEDURE — 77030039425 HC BLD LARYNG TRULITE DISP TELE -A: Performed by: NURSE ANESTHETIST, CERTIFIED REGISTERED

## 2021-07-13 PROCEDURE — 74011250636 HC RX REV CODE- 250/636: Performed by: ORTHOPAEDIC SURGERY

## 2021-07-13 PROCEDURE — 76010000171 HC OR TIME 2 TO 2.5 HR INTENSV-TIER 1: Performed by: ORTHOPAEDIC SURGERY

## 2021-07-13 PROCEDURE — 76210000063 HC OR PH I REC FIRST 0.5 HR: Performed by: ORTHOPAEDIC SURGERY

## 2021-07-13 PROCEDURE — 77030002933 HC SUT MCRYL J&J -A: Performed by: ORTHOPAEDIC SURGERY

## 2021-07-13 PROCEDURE — 77030019605: Performed by: ORTHOPAEDIC SURGERY

## 2021-07-13 PROCEDURE — 77030037088 HC TUBE ENDOTRACH ORAL NSL COVD-A: Performed by: NURSE ANESTHETIST, CERTIFIED REGISTERED

## 2021-07-13 PROCEDURE — 77030003602 HC NDL NRV BLK BBMI -B: Performed by: NURSE ANESTHETIST, CERTIFIED REGISTERED

## 2021-07-13 PROCEDURE — 77030027384 HC PRB ARTHSCP SERFAS STRY -C: Performed by: ORTHOPAEDIC SURGERY

## 2021-07-13 DEVICE — ANCHOR SUT DIA2.9MM NO2 MAXBRAID DBL LD JUGGERKNOT: Type: IMPLANTABLE DEVICE | Site: SHOULDER | Status: FUNCTIONAL

## 2021-07-13 RX ORDER — CEFAZOLIN SODIUM/WATER 2 G/20 ML
2 SYRINGE (ML) INTRAVENOUS ONCE
Status: COMPLETED | OUTPATIENT
Start: 2021-07-13 | End: 2021-07-13

## 2021-07-13 RX ORDER — DIPHENHYDRAMINE HYDROCHLORIDE 50 MG/ML
12.5 INJECTION, SOLUTION INTRAMUSCULAR; INTRAVENOUS
Status: DISCONTINUED | OUTPATIENT
Start: 2021-07-13 | End: 2021-07-13 | Stop reason: HOSPADM

## 2021-07-13 RX ORDER — MIDAZOLAM HYDROCHLORIDE 1 MG/ML
2 INJECTION, SOLUTION INTRAMUSCULAR; INTRAVENOUS ONCE
Status: COMPLETED | OUTPATIENT
Start: 2021-07-13 | End: 2021-07-13

## 2021-07-13 RX ORDER — EPHEDRINE SULFATE/0.9% NACL/PF 50 MG/5 ML
SYRINGE (ML) INTRAVENOUS AS NEEDED
Status: DISCONTINUED | OUTPATIENT
Start: 2021-07-13 | End: 2021-07-13 | Stop reason: HOSPADM

## 2021-07-13 RX ORDER — DEXAMETHASONE SODIUM PHOSPHATE 4 MG/ML
INJECTION, SOLUTION INTRA-ARTICULAR; INTRALESIONAL; INTRAMUSCULAR; INTRAVENOUS; SOFT TISSUE AS NEEDED
Status: DISCONTINUED | OUTPATIENT
Start: 2021-07-13 | End: 2021-07-13 | Stop reason: HOSPADM

## 2021-07-13 RX ORDER — GLYCOPYRROLATE 0.2 MG/ML
INJECTION INTRAMUSCULAR; INTRAVENOUS AS NEEDED
Status: DISCONTINUED | OUTPATIENT
Start: 2021-07-13 | End: 2021-07-13 | Stop reason: HOSPADM

## 2021-07-13 RX ORDER — HYDROMORPHONE HYDROCHLORIDE 1 MG/ML
0.5 INJECTION, SOLUTION INTRAMUSCULAR; INTRAVENOUS; SUBCUTANEOUS
Status: DISCONTINUED | OUTPATIENT
Start: 2021-07-13 | End: 2021-07-13 | Stop reason: HOSPADM

## 2021-07-13 RX ORDER — ONDANSETRON 2 MG/ML
INJECTION INTRAMUSCULAR; INTRAVENOUS AS NEEDED
Status: DISCONTINUED | OUTPATIENT
Start: 2021-07-13 | End: 2021-07-13 | Stop reason: HOSPADM

## 2021-07-13 RX ORDER — MIDAZOLAM HYDROCHLORIDE 1 MG/ML
2 INJECTION, SOLUTION INTRAMUSCULAR; INTRAVENOUS
Status: DISCONTINUED | OUTPATIENT
Start: 2021-07-13 | End: 2021-07-13 | Stop reason: HOSPADM

## 2021-07-13 RX ORDER — SODIUM CHLORIDE, SODIUM LACTATE, POTASSIUM CHLORIDE, CALCIUM CHLORIDE 600; 310; 30; 20 MG/100ML; MG/100ML; MG/100ML; MG/100ML
100 INJECTION, SOLUTION INTRAVENOUS CONTINUOUS
Status: DISCONTINUED | OUTPATIENT
Start: 2021-07-13 | End: 2021-07-13 | Stop reason: HOSPADM

## 2021-07-13 RX ORDER — OXYCODONE HYDROCHLORIDE 5 MG/1
10 TABLET ORAL
Status: DISCONTINUED | OUTPATIENT
Start: 2021-07-13 | End: 2021-07-13 | Stop reason: HOSPADM

## 2021-07-13 RX ORDER — BUPIVACAINE HYDROCHLORIDE AND EPINEPHRINE 2.5; 5 MG/ML; UG/ML
INJECTION, SOLUTION EPIDURAL; INFILTRATION; INTRACAUDAL; PERINEURAL
Status: COMPLETED | OUTPATIENT
Start: 2021-07-13 | End: 2021-07-13

## 2021-07-13 RX ORDER — ONDANSETRON 2 MG/ML
4 INJECTION INTRAMUSCULAR; INTRAVENOUS ONCE
Status: DISCONTINUED | OUTPATIENT
Start: 2021-07-13 | End: 2021-07-13 | Stop reason: HOSPADM

## 2021-07-13 RX ORDER — ROCURONIUM BROMIDE 10 MG/ML
INJECTION, SOLUTION INTRAVENOUS AS NEEDED
Status: DISCONTINUED | OUTPATIENT
Start: 2021-07-13 | End: 2021-07-13 | Stop reason: HOSPADM

## 2021-07-13 RX ORDER — NALOXONE HYDROCHLORIDE 0.4 MG/ML
0.1 INJECTION, SOLUTION INTRAMUSCULAR; INTRAVENOUS; SUBCUTANEOUS AS NEEDED
Status: DISCONTINUED | OUTPATIENT
Start: 2021-07-13 | End: 2021-07-13 | Stop reason: HOSPADM

## 2021-07-13 RX ORDER — LIDOCAINE HYDROCHLORIDE 20 MG/ML
INJECTION, SOLUTION EPIDURAL; INFILTRATION; INTRACAUDAL; PERINEURAL AS NEEDED
Status: DISCONTINUED | OUTPATIENT
Start: 2021-07-13 | End: 2021-07-13 | Stop reason: HOSPADM

## 2021-07-13 RX ORDER — ALBUTEROL SULFATE 0.83 MG/ML
2.5 SOLUTION RESPIRATORY (INHALATION) AS NEEDED
Status: DISCONTINUED | OUTPATIENT
Start: 2021-07-13 | End: 2021-07-13 | Stop reason: HOSPADM

## 2021-07-13 RX ORDER — OXYCODONE HYDROCHLORIDE 5 MG/1
5 TABLET ORAL
Status: DISCONTINUED | OUTPATIENT
Start: 2021-07-13 | End: 2021-07-13 | Stop reason: HOSPADM

## 2021-07-13 RX ORDER — NEOSTIGMINE METHYLSULFATE 1 MG/ML
INJECTION, SOLUTION INTRAVENOUS AS NEEDED
Status: DISCONTINUED | OUTPATIENT
Start: 2021-07-13 | End: 2021-07-13 | Stop reason: HOSPADM

## 2021-07-13 RX ORDER — FENTANYL CITRATE 50 UG/ML
INJECTION, SOLUTION INTRAMUSCULAR; INTRAVENOUS AS NEEDED
Status: DISCONTINUED | OUTPATIENT
Start: 2021-07-13 | End: 2021-07-13 | Stop reason: HOSPADM

## 2021-07-13 RX ORDER — PROPOFOL 10 MG/ML
INJECTION, EMULSION INTRAVENOUS AS NEEDED
Status: DISCONTINUED | OUTPATIENT
Start: 2021-07-13 | End: 2021-07-13 | Stop reason: HOSPADM

## 2021-07-13 RX ORDER — EPINEPHRINE 1 MG/ML
INJECTION, SOLUTION, CONCENTRATE INTRAVENOUS AS NEEDED
Status: DISCONTINUED | OUTPATIENT
Start: 2021-07-13 | End: 2021-07-13 | Stop reason: HOSPADM

## 2021-07-13 RX ORDER — FENTANYL CITRATE 50 UG/ML
100 INJECTION, SOLUTION INTRAMUSCULAR; INTRAVENOUS ONCE
Status: DISCONTINUED | OUTPATIENT
Start: 2021-07-13 | End: 2021-07-13 | Stop reason: HOSPADM

## 2021-07-13 RX ORDER — BUPIVACAINE HYDROCHLORIDE AND EPINEPHRINE 5; 5 MG/ML; UG/ML
INJECTION, SOLUTION EPIDURAL; INTRACAUDAL; PERINEURAL
Status: COMPLETED | OUTPATIENT
Start: 2021-07-13 | End: 2021-07-13

## 2021-07-13 RX ORDER — LIDOCAINE HYDROCHLORIDE 10 MG/ML
0.1 INJECTION INFILTRATION; PERINEURAL AS NEEDED
Status: DISCONTINUED | OUTPATIENT
Start: 2021-07-13 | End: 2021-07-13 | Stop reason: HOSPADM

## 2021-07-13 RX ADMIN — PHENYLEPHRINE HYDROCHLORIDE 100 MCG: 10 INJECTION INTRAVENOUS at 08:18

## 2021-07-13 RX ADMIN — Medication 5 MG: at 08:51

## 2021-07-13 RX ADMIN — SODIUM CHLORIDE, SODIUM LACTATE, POTASSIUM CHLORIDE, AND CALCIUM CHLORIDE: 600; 310; 30; 20 INJECTION, SOLUTION INTRAVENOUS at 08:27

## 2021-07-13 RX ADMIN — PHENYLEPHRINE HYDROCHLORIDE 50 MCG: 10 INJECTION INTRAVENOUS at 08:56

## 2021-07-13 RX ADMIN — PROPOFOL 150 MG: 10 INJECTION, EMULSION INTRAVENOUS at 07:37

## 2021-07-13 RX ADMIN — Medication 10 MG: at 08:22

## 2021-07-13 RX ADMIN — PHENYLEPHRINE HYDROCHLORIDE 100 MCG: 10 INJECTION INTRAVENOUS at 08:02

## 2021-07-13 RX ADMIN — PHENYLEPHRINE HYDROCHLORIDE 100 MCG: 10 INJECTION INTRAVENOUS at 09:16

## 2021-07-13 RX ADMIN — CEFAZOLIN 2 G: 1 INJECTION, POWDER, FOR SOLUTION INTRAVENOUS at 07:51

## 2021-07-13 RX ADMIN — BUPIVACAINE HYDROCHLORIDE AND EPINEPHRINE BITARTRATE 15 ML: 2.5; .005 INJECTION, SOLUTION EPIDURAL; INFILTRATION; INTRACAUDAL; PERINEURAL at 07:50

## 2021-07-13 RX ADMIN — PHENYLEPHRINE HYDROCHLORIDE 100 MCG: 10 INJECTION INTRAVENOUS at 07:45

## 2021-07-13 RX ADMIN — SODIUM CHLORIDE, SODIUM LACTATE, POTASSIUM CHLORIDE, AND CALCIUM CHLORIDE 100 ML/HR: 600; 310; 30; 20 INJECTION, SOLUTION INTRAVENOUS at 06:06

## 2021-07-13 RX ADMIN — MIDAZOLAM 2 MG: 1 INJECTION INTRAMUSCULAR; INTRAVENOUS at 06:44

## 2021-07-13 RX ADMIN — GLYCOPYRROLATE 0.4 MG: 0.2 INJECTION, SOLUTION INTRAMUSCULAR; INTRAVENOUS at 09:21

## 2021-07-13 RX ADMIN — DEXAMETHASONE SODIUM PHOSPHATE 4 MG: 4 INJECTION, SOLUTION INTRAMUSCULAR; INTRAVENOUS at 08:00

## 2021-07-13 RX ADMIN — Medication 5 MG: at 08:34

## 2021-07-13 RX ADMIN — ONDANSETRON 4 MG: 2 INJECTION INTRAMUSCULAR; INTRAVENOUS at 08:21

## 2021-07-13 RX ADMIN — LIDOCAINE HYDROCHLORIDE 40 MG: 20 INJECTION, SOLUTION EPIDURAL; INFILTRATION; INTRACAUDAL; PERINEURAL at 07:37

## 2021-07-13 RX ADMIN — Medication 5 MG: at 09:04

## 2021-07-13 RX ADMIN — ROCURONIUM BROMIDE 50 MG: 10 INJECTION, SOLUTION INTRAVENOUS at 07:37

## 2021-07-13 RX ADMIN — PHENYLEPHRINE HYDROCHLORIDE 50 MCG: 10 INJECTION INTRAVENOUS at 08:41

## 2021-07-13 RX ADMIN — FENTANYL CITRATE 100 MCG: 50 INJECTION INTRAMUSCULAR; INTRAVENOUS at 07:37

## 2021-07-13 RX ADMIN — PHENYLEPHRINE HYDROCHLORIDE 100 MCG: 10 INJECTION INTRAVENOUS at 07:10

## 2021-07-13 RX ADMIN — Medication 5 MG: at 08:29

## 2021-07-13 RX ADMIN — Medication 3 AMPULE: at 06:05

## 2021-07-13 RX ADMIN — PHENYLEPHRINE HYDROCHLORIDE 100 MCG: 10 INJECTION INTRAVENOUS at 08:09

## 2021-07-13 RX ADMIN — BUPIVACAINE HYDROCHLORIDE AND EPINEPHRINE BITARTRATE 15 ML: 5; .005 INJECTION, SOLUTION EPIDURAL; INTRACAUDAL; PERINEURAL at 07:50

## 2021-07-13 RX ADMIN — Medication 3 MG: at 09:21

## 2021-07-13 RX ADMIN — Medication 5 MG: at 08:41

## 2021-07-13 NOTE — ANESTHESIA PROCEDURE NOTES
Peripheral Block    Start time: 7/13/2021 6:44 AM  End time: 7/13/2021 6:50 AM  Performed by: Edis Gunn MD  Authorized by: Edis Gunn MD       Pre-procedure: Indications: at surgeon's request and post-op pain management    Preanesthetic Checklist: patient identified, risks and benefits discussed, site marked, timeout performed, anesthesia consent given and patient being monitored    Timeout Time: 06:44 EDT          Block Type:   Block Type:   Interscalene  Laterality:  Left  Monitoring:  Standard ASA monitoring, continuous pulse ox, frequent vital sign checks, heart rate, oxygen and responsive to questions  Injection Technique:  Single shot  Procedures: ultrasound guided    Patient Position: supine  Prep: chlorhexidine    Location:  Interscalene  Needle Type:  Stimuplex  Needle Gauge:  21 G  Needle Localization:  Ultrasound guidance and anatomical landmarks  Medication Injected:  Bupivacaine 0.25% -EPINEPHrine 1:200,000 (SENSORCAINE) mg injection, 15 mL  bupivacaine-EPINEPHrine (PF)(SENSORCAINE) 0.5%-1:200,000 mg injection, 15 mL  Med Admin Time: 7/13/2021 7:50 AM    Assessment:  Number of attempts:  1  Injection Assessment:  Incremental injection every 5 mL, local visualized surrounding nerve on ultrasound, negative aspiration for blood, no paresthesia, no intravascular symptoms and ultrasound image on chart  Patient tolerance:  Patient tolerated the procedure well with no immediate complications

## 2021-07-13 NOTE — DISCHARGE INSTRUCTIONS
Maintain sling and may remove and begin gentle ROM of elbow only when block wears off. Rx called to patient's pharmacy. Ice to shoulder overnight. F/U in 10 days. Call office with questions or problems. What to Expect After a Nerve Block  Nerve blocks affect many types of nerves. The affected nerves control movement, pain, and normal sensation. This causes feelings such as:    · Weakness  · Numbness  · Tingling  · Heaviness  · A feeling that your arm or leg has \"fallen asleep\"    A nerve block can last for 24-48 hours, depending on the medications used. Usually the weakness wears off first, and then you feel a numb/tingly sensation. Finally, the pain may come back. This can happen in any order. If you had a shoulder block, you may have other symptoms such as:    · Mild shortness of breath  · A hoarse voice  · Blurry vision  · Unequal pupils  · Drooping of your face on the same side as the nerve block    These are common and expected side effects of this type of nerve block. Symptoms usually go away within 12 hours. If these symptoms do not go away, please call the Anesthesiology Department at [phone] and ask for Anesthesiologist on call. If you have severe or prolonged shortness of breath, please go to the nearest emergency room. If you continue to feel the effects of the nerve block for longer than 48 hours, please call the Anesthesiology Department at [phone]. Pain Medication  If needed, your surgeon will give you a prescription for pain medication. Nerve blocks sometimes wear off during the night. It is a good idea to take your pain medicine before going to sleep so you won't wake up with pain. The idea is to have pain medicine in your body before the nerve block wears off. To help prevent nausea, eat something before taking the pain medicine. Once a nerve block starts to wear off, it is usually completely gone within 60 minutes.  It is important to have pain medicine in your system before the block wears off completely. Helpful Tips to Protect the Part of Your Body That Is Numb  After a nerve block, you cannot feel pain, pressure, or extremes in temperature. Because your arm or leg is numb, it is more at risk for injury. For example, if working near a hot oven, you could burn your arm or leg without knowing it. Cont'd  Helpful Tips to Protect the Part of Your Body That Is Numb    Here are some hints to help protect your limb while it is numb:    · While you are awake, try to change positions of your arm or leg often. This will help you avoid putting too much pressure on the limb for long periods of time. · While sleeping, pad the blocked limb with pillows to avoid placing too much pressure on the limb. · If you have a cast or a tight dressing, check the color of your finger/toes every couple hours. Call your doctor if they look discolored. · If you had a shoulder, arm, or hand nerve block, you may go home with a sling. The sling will help to keep your arm in the ideal position. Wear the sling at all times until feeling returns. If you do not have a sling, watch the position of the blocked arm to make sure it is in a safe location. · If you had a leg or foot block, walk with crutches and assistance until the block wears off completely. Bearing weight on a partially numb leg may result in a fall and further injury. · Ask your family or support people to help with the above hints. TYPICAL SIDE EFFECTS OF PAIN MEDICATION:  *    Constipation: Drink lots of fluids, try prune juice. OTC stool softener if needed. *    Nausea: Take pain medication with food. ACTIVITY  · As tolerated and as directed by your doctor. · Bathe or shower as directed by your doctor. DIET  · Day of surgery: Clear liquids until no nausea or vomiting; small portion, light diet [place] foods (ex: baked chicken, plain rice, grits, scrambled eggs, toast).          Nothing greasy, fried or spicy today. · Advance to regular diet on second day, unless your doctor orders otherwise. · If nausea and vomiting continues, call your doctor. PAIN  · Take pain medication as directed by your doctor. · DO NOT take aspirin or blood thinners unless directed by your doctor. CALL YOUR DOCTOR IF    s Call your doctor if pain is NOT relieved by medication.   s Excessive bleeding that does not stop after holding pressure over the area  · Temperature of 101 degrees F or above  · Excessive redness, swelling or bruising, and/ or green or yellow, smelly discharge from incision    AFTER ANESTHESIA   · For the first 24 hours: DO NOT Drive, Drink alcoholic beverages, or Make important decisions. · Be aware of dizziness following anesthesia and while taking pain medication. DISCHARGE SUMMARY from Nurse    PATIENT INSTRUCTIONS:    After general anesthesia or intravenous sedation, for 24 hours or while taking prescription Narcotics:  · Limit your activities  · Do not drive and operate hazardous machinery  · Do not make important personal or business decisions  · Do  not drink alcoholic beverages  · If you have not urinated within 8 hours after discharge, please contact your surgeon on call. *  Please give a list of your current medications to your Primary Care Provider. *  Please update this list whenever your medications are discontinued, doses are      changed, or new medications (including over-the-counter products) are added. *  Please carry medication information at all times in case of emergency situations. Preventing Infection at Home  We care about preventing infection and avoiding the spread of germs - not only when you are in the hospital but also when you return home. When you return home from the hospital, its important to take the following steps to help prevent infection and avoid spreading germs that could infect you and others.  Ask everyone in your home to follow these guidelines, too. Clean Your Hands  · Clean your hands whenever your hands are visibly dirty, before you eat, before or after touching your mouth, nose or eyes, and before preparing food. Clean them after contact with body fluids, using the restroom, touching animals or changing diapers. · When washing hands, wet them with warm water and work up a lather. Rub hands for at least 15 seconds, then rinse them and pat them dry with a clean towel or paper towel. · When using hand sanitizers, it should take about 15 seconds to rub your hands dry. If not, you probably didnt apply enough . Cover Your Sneeze or Cough  Germs are released into the air whenever you sneeze or cough. To prevent the spread of infection:  · Turn away from other people before coughing or sneezing. · Cover your mouth or nose with a tissue when you cough or sneeze. Put the tissue in the trash. · If you dont have a tissue, cough or sneeze into your upper sleeve, not your hands. · Always clean your hands after coughing or sneezing. Care for Wounds  Your skin is your bodys first line of defense against germs, but an open wound leaves an easy way for germs to enter your body. To prevent infection:  · Clean your hands before and after changing wound dressings, and wear gloves to change dressings if recommended by your doctor. · Take special care with IV lines or other devices inserted into the body. If you must touch them, clean your hands first.  · Follow any specific instructions from your doctor to care for your wounds. Contact your doctor if you experience any signs of infection, such as fever or increased redness at the surgical or wound site. Keep a Clean Home  · Clean or wipe commonly touched hard surfaces like door handles, sinks, tabletops, phones and TV remotes. · Use products labeled disinfectant to kill harmful bacteria and viruses. · Use a clean cloth or paper towel to clean and dry surfaces.  Wiping surfaces with a dirty dishcloth, sponge or towel will only spread germs. · Never share toothbrushes, mitchell, drinking glasses, utensils, razor blades, face cloths or bath towels to avoid spreading germs. · Be sure that the linens that you sleep on are clean. · Keep pets away from wounds and wash your hands after touching pets, their toys or bedding. We care about you and your health. Remember, preventing infections is a team effort between you, your family, friends and health care providers. These are general instructions for a healthy lifestyle:    No smoking/ No tobacco products/ Avoid exposure to second hand smoke    Surgeon General's Warning:  Quitting smoking now greatly reduces serious risk to your health. Obesity, smoking, and sedentary lifestyle greatly increases your risk for illness    A healthy diet, regular physical exercise & weight monitoring are important for maintaining a healthy lifestyle    You may be retaining fluid if you have a history of heart failure or if you experience any of the following symptoms:  Weight gain of 3 pounds or more overnight or 5 pounds in a week, increased swelling in our hands or feet or shortness of breath while lying flat in bed. Please call your doctor as soon as you notice any of these symptoms; do not wait until your next office visit. Recognize signs and symptoms of STROKE:    F-face looks uneven    A-arms unable to move or move unevenly    S-speech slurred or non-existent    T-time-call 911 as soon as signs and symptoms begin-DO NOT go       Back to bed or wait to see if you get better-TIME IS BRAIN.

## 2021-07-13 NOTE — BRIEF OP NOTE
Brief Postoperative Note    Patient: Jeff Rush  YOB: 1960  MRN: [id number]    Date of Procedure: 7/13/2021     Pre-Op Diagnosis: Complete tear of left rotator cuff, unspecified whether traumatic [M75.122]calcific tendonitis, impingement and AC joint DJD    Post-Op Diagnosis: Same as preoperative diagnosis. Procedure(s):  SHOULDER ARTHROSCOPY ROTATOR CUFF REPAIR/ LEFT, SAD, excision of calcium deposit and Jersey. Surgeon(s):  MD Macho Fermin MD    Surgical Assistant: None    Anesthesia: General     Estimated Blood Loss (mL): less than 50     Complications: None noted. Specimens: * No specimens in log *     Implants:   Implant Name Type Inv.  Item Serial No.  Lot No. LRB No. Used Action   ANCHOR SUT Idamae Dav 2.9MM --  - [id number]  ANCHOR SUT JUGGERKNOT 2.9MM --   [employer] [id number] Left 1 Implanted       Drains: * No LDAs found *    Findings: as per op note    Electronically Signed by Nancy Benton MD on 7/13/2021 at 9:18 AM

## 2021-07-13 NOTE — H&P
History and Physical Updated with no interval change.  Nicolas Bell MD H&P Update: No change since previous exam.    Allie Madrid M.D.  7/13/2021

## 2021-07-13 NOTE — ANESTHESIA PREPROCEDURE EVALUATION
Relevant Problems   No relevant active problems       Anesthetic History   No history of anesthetic complications            Review of Systems / Medical History  Patient summary reviewed, nursing notes reviewed and pertinent labs reviewed    Pulmonary            Asthma        Neuro/Psych   Within defined limits           Cardiovascular    Hypertension: well controlled              Exercise tolerance: >4 METS     GI/Hepatic/Renal     GERD           Endo/Other    Diabetes: well controlled, type 2         Other Findings              Physical Exam    Airway  Mallampati: III  TM Distance: 4 - 6 cm  Neck ROM: normal range of motion   Mouth opening: Normal     Cardiovascular  Regular rate and rhythm,  S1 and S2 normal,  no murmur, click, rub, or gallop             Dental  No notable dental hx       Pulmonary  Breath sounds clear to auscultation               Abdominal         Other Findings            Anesthetic Plan    ASA: 3  Anesthesia type: general      Post-op pain plan if not by surgeon: peripheral nerve block single    Induction: Intravenous  Anesthetic plan and risks discussed with: Patient

## 2021-07-13 NOTE — ANESTHESIA POSTPROCEDURE EVALUATION
Procedure(s):  SHOULDER ARTHROSCOPY ROTATOR CUFF REPAIR/ LEFT. general    Anesthesia Post Evaluation      Multimodal analgesia: multimodal analgesia used between 6 hours prior to anesthesia start to PACU discharge  Patient location during evaluation: PACU  Patient participation: complete - patient participated  Level of consciousness: awake and awake and alert  Pain management: adequate  Airway patency: patent  Anesthetic complications: no  Cardiovascular status: acceptable  Respiratory status: acceptable  Hydration status: acceptable  Post anesthesia nausea and vomiting:  controlled      INITIAL Post-op Vital signs:   Vitals Value Taken Time   /77 07/13/21 1013   Temp 36.4 °C (97.5 °F) 07/13/21 0953   Pulse 93 07/13/21 1014   Resp 16 07/13/21 1013   SpO2 96 % 07/13/21 1014   Vitals shown include unvalidated device data.

## 2021-07-14 NOTE — OP NOTES
[facility]  OPERATIVE REPORT    Name:  Grazyna Geronimo  MR#:  [id number]  :  1960  ACCOUNT #:  [de-identified]  DATE OF SERVICE:  2021    PREOPERATIVE DIAGNOSES:  Left shoulder rotator cuff tear, impingement, acromioclavicular joint degenerative joint disease and calcific tendinitis. POSTOPERATIVE DIAGNOSES:  Left shoulder rotator cuff tear, impingement, acromioclavicular joint degenerative joint disease and calcific tendinitis. PROCEDURES PERFORMED:  Left shoulder arthroscopy with mini open rotator cuff repair, arthroscopic subacromial decompression, arthroscopic Jersey, arthroscopic calcific tendon debridement. SURGEON:  MD Yamilet Simon    ANESTHESIA:  general.    COMPLICATIONS:  None. SPECIMENS REMOVED:  no.    IMPLANTS:  See brief op note. ESTIMATED BLOOD LOSS:  Minimal.    BRIEF HISTORY:  The patient is a 79-year-old female with a history of left shoulder pain. An MRI scan is significant for rotator cuff tear, impingement, calcific tendinitis. We discussed different treatment options. She has opted for the surgical procedure as described and informed consent was obtained. PROCEDURE:  The patient was seen in the preoperative area. Her shoulder was marked. Her chart was updated. She received interscalene block by Anesthesia previously. She was taken to the operating room #7. Successful general anesthetic was achieved. She received Ancef perioperatively. Timeout was performed and confirmed by the operative team.  The left shoulder was prepped and draped into a sterile field. Posterior portal was made two fingerbreadths inferior and medial to the posterolateral corner of the acromion. An anterior portal was not created because we found significant pathology in terms of labral issues or intraarticular. We were able to locate the undersurface tear of the rotator cuff.   We then went over the top position, performed a thorough subacromial decompression and removed bursal tissue. We located the calcific tendinitis. We debrided this down. We located a moderate-sized rotator cuff tear. We debrided the acromion to create a significant space in this area. We extended our anterolateral portal which we previously created proximally 2.5 cm. We split the muscles in half but did not detach it. We were able to place in one anchor. It is a split-type tear. We were able to converge this with one anchor across the two and then a separate FiberWire suture to converge that together. We thoroughly irrigated the wounds. We closed the posterior portal with a simple Dermalon stitch, 0 Vicryl, 2-0 Vicryl, and Monocryl suture subcuticular stitch with Dermabond over the extended anterolateral portal incision. Sterile bulky dressings applied. No complications. She tolerated the procedure well. Follow up in our office in 10 days. Call sooner if any problems.       Heidy Ellis MD      [id number]/  D:  07/14/2021 16:58  T:  07/14/2021 18:25  JOB #:  [id number]

## 2021-07-29 NOTE — OP NOTES
[facility]  OPERATIVE REPORT    Name:  Manav Garvey  MR#:  [id number]  :  1960  ACCOUNT #:  [de-identified]  DATE OF SERVICE:  2021    PREOPERATIVE DIAGNOSES:  1. Shoulder rotator cuff tear. 2.  Subacromial impingement. 3.  Acromioclavicular joint degenerative joint disease. 5.  Calcific tendinitis. POSTOPERATIVE DIAGNOSES:  1. Shoulder rotator cuff tear. 2.  Subacromial impingement. 3.  Acromioclavicular joint degenerative joint disease. 5.  Calcific tendinitis. PROCEDURES PERFORMED:  1. Left shoulder arthroscopy with rotator cuff repair, mini open. 2.  Subacromial decompression with arthroscopic [place]. 3.  Calcific tendinitis debridement. SURGEON:  Timmy Spain MD    ASSISTANT SURGEON:  Liseth Soliman MD     ANESTHESIA:  general.    COMPLICATIONS:  None noted. SPECIMENS REMOVED:  None . IMPLANTS:  See brief op note. ESTIMATED BLOOD LOSS:  minimal.    INDICATIONS:  This patient is a 79-year-old female with history of progressive and disabling left shoulder pain. Subsequent plain films showed calcific tendonitis and subsequent MRI scanning showed significant rotator cuff tearing, as well as subacromial impingement and AC joint DJD. We discussed different treatment options and she has opted to perform a shoulder arthroscopy with partial open rotator cuff repair. She is informed of the risks and complications and informed consent was obtained. PROCEDURE:  The patient was seen in the preoperative area. Her shoulder was marked. Her chart was updated. She had an interscalene block placed by Anesthesia. She was taken to the operating room #7. Successful anesthesia was achieved. The left shoulder was prepped and draped in a sterile field. A time-out was confirmed by the operative team.  She received 2 g of Ancef perioperatively. A posterior portal was made two fingerbreadths inferior and medial to the posterolateral corner of the acromion.   A Wissinger switching halle was used to create an anterior portal and the shoulder was inspected in a systematic fashion. There was noted to be some small fibrillation of the labrum, which was debrided. We were able to see a large tear from underneath the rotator cuff. We then went over the top position and created an anterolateral portal and performed a systematic and thorough debridement of the subacromial space as well as the calcific tendon deposit. This was debrided down to a stable base. The rotator cuff tear was located. We then extended our anterolateral portal approximately 3 cm, split the deltoid to find the tear, debrided further. I used JuggerKnot anchors and then performed a thorough repair of the tendon in an open fashion utilizing an arthroscopic suture passer. We did get a very secure repair. We thoroughly irrigated the wounds. We closed our portals with simple sutures. We repaired our lateral wounds or incision with Vicryl and then 3-0 Monocryl with Dermabond over this. EBL was minimal.  No complications noted. She will be discharged to home with appropriate pain medication. Follow up appointment scheduled and a sling and instructions to range of motion her elbow when her block wears off. She is to call the office with any questions or problems.         MD ROBER Davis/[email or web address]  D:  07/28/2021 21:20  T:  07/28/2021 23:30  JOB #:  [id number]

## 2021-09-01 ENCOUNTER — HOSPITAL ENCOUNTER (OUTPATIENT)
Dept: MRI IMAGING | Age: 61
Discharge: HOME | End: 2021-09-01
Attending: INTERNAL MEDICINE
Payer: COMMERCIAL

## 2021-09-01 DIAGNOSIS — N60.92 ATYPICAL DUCTAL HYPERPLASIA OF BOTH BREASTS: ICD-10-CM

## 2021-09-01 DIAGNOSIS — N60.91 ATYPICAL DUCTAL HYPERPLASIA OF BOTH BREASTS: ICD-10-CM

## 2021-09-01 PROCEDURE — A9576 INJ PROHANCE MULTIPACK: HCPCS | Performed by: INTERNAL MEDICINE

## 2021-09-01 PROCEDURE — 77049 MRI BREAST C-+ W/CAD BI: CPT

## 2021-09-01 PROCEDURE — 74011250636 HC RX REV CODE- 250/636: Performed by: INTERNAL MEDICINE

## 2021-09-01 RX ORDER — SODIUM CHLORIDE 0.9 % (FLUSH) 0.9 %
10 SYRINGE (ML) INJECTION
Status: COMPLETED | OUTPATIENT
Start: 2021-09-01 | End: 2021-09-01

## 2021-09-01 RX ADMIN — Medication 10 ML: at 09:54

## 2021-09-01 RX ADMIN — GADOTERIDOL 14 ML: 279.3 INJECTION, SOLUTION INTRAVENOUS at 09:54

## 2021-11-02 ENCOUNTER — HOSPITAL ENCOUNTER (EMERGENCY)
Age: 61
Discharge: HOME | End: 2021-11-02
Attending: EMERGENCY MEDICINE
Payer: COMMERCIAL

## 2021-11-02 VITALS
OXYGEN SATURATION: 94 % | RESPIRATION RATE: 20 BRPM | WEIGHT: 140 LBS | TEMPERATURE: 97.5 F | DIASTOLIC BLOOD PRESSURE: 83 MMHG | HEIGHT: 65 IN | BODY MASS INDEX: 23.32 KG/M2 | HEART RATE: 116 BPM | SYSTOLIC BLOOD PRESSURE: 145 MMHG

## 2021-11-02 DIAGNOSIS — K20.90 BARRETT'S ESOPHAGUS WITH ESOPHAGITIS: Primary | ICD-10-CM

## 2021-11-02 DIAGNOSIS — K22.70 BARRETT'S ESOPHAGUS WITH ESOPHAGITIS: Primary | ICD-10-CM

## 2021-11-02 DIAGNOSIS — R10.13 ABDOMINAL PAIN, EPIGASTRIC: ICD-10-CM

## 2021-11-02 DIAGNOSIS — R11.2 NON-INTRACTABLE VOMITING WITH NAUSEA, UNSPECIFIED VOMITING TYPE: ICD-10-CM

## 2021-11-02 LAB
ALBUMIN SERPL-MCNC: 4.6 G/DL (ref 3.2–4.6)
ALBUMIN/GLOB SERPL: 0.7 {RATIO} (ref 1.2–3.5)
ALP SERPL-CCNC: 114 U/L (ref 50–136)
ALT SERPL-CCNC: 64 U/L (ref 12–65)
ANION GAP SERPL CALC-SCNC: 12 MMOL/L (ref 7–16)
AST SERPL-CCNC: 72 U/L (ref 15–37)
BASOPHILS # BLD: 0 K/UL (ref 0–0.2)
BASOPHILS NFR BLD: 0 % (ref 0–2)
BILIRUB SERPL-MCNC: 0.6 MG/DL (ref 0.2–1.1)
BUN SERPL-MCNC: 11 MG/DL (ref 8–23)
CALCIUM SERPL-MCNC: 8.8 MG/DL (ref 8.3–10.4)
CHLORIDE SERPL-SCNC: 97 MMOL/L (ref 98–107)
CO2 SERPL-SCNC: 25 MMOL/L (ref 21–32)
CREAT SERPL-MCNC: 1.24 MG/DL (ref 0.6–1)
DIFFERENTIAL METHOD BLD: ABNORMAL
EOSINOPHIL # BLD: 0 K/UL (ref 0–0.8)
EOSINOPHIL NFR BLD: 0 % (ref 0.5–7.8)
ERYTHROCYTE [DISTWIDTH] IN BLOOD BY AUTOMATED COUNT: 17.2 % (ref 11.9–14.6)
GLOBULIN SER CALC-MCNC: 6.2 G/DL (ref 2.3–3.5)
GLUCOSE SERPL-MCNC: 164 MG/DL (ref 65–100)
HCT VFR BLD AUTO: 41.4 % (ref 35.8–46.3)
HGB BLD-MCNC: 13.1 G/DL (ref 11.7–15.4)
IMM GRANULOCYTES # BLD AUTO: 0 K/UL (ref 0–0.5)
IMM GRANULOCYTES NFR BLD AUTO: 0 % (ref 0–5)
LIPASE SERPL-CCNC: 202 U/L (ref 73–393)
LYMPHOCYTES # BLD: 2.5 K/UL (ref 0.5–4.6)
LYMPHOCYTES NFR BLD: 23 % (ref 13–44)
MAGNESIUM SERPL-MCNC: <0.3 MG/DL (ref 1.8–2.4)
MCH RBC QN AUTO: 25.4 PG (ref 26.1–32.9)
MCHC RBC AUTO-ENTMCNC: 31.6 G/DL (ref 31.4–35)
MCV RBC AUTO: 80.4 FL (ref 79.6–97.8)
MONOCYTES # BLD: 0.4 K/UL (ref 0.1–1.3)
MONOCYTES NFR BLD: 3 % (ref 4–12)
NEUTS SEG # BLD: 8.1 K/UL (ref 1.7–8.2)
NEUTS SEG NFR BLD: 74 % (ref 43–78)
NRBC # BLD: 0 K/UL (ref 0–0.2)
PLATELET # BLD AUTO: 379 K/UL (ref 150–450)
PMV BLD AUTO: 10.8 FL (ref 9.4–12.3)
POTASSIUM SERPL-SCNC: 3 MMOL/L (ref 3.5–5.1)
PROT SERPL-MCNC: 10.8 G/DL (ref 6.3–8.2)
RBC # BLD AUTO: 5.15 M/UL (ref 4.05–5.2)
SODIUM SERPL-SCNC: 134 MMOL/L (ref 136–145)
WBC # BLD AUTO: 11 K/UL (ref 4.3–11.1)

## 2021-11-02 PROCEDURE — 96374 THER/PROPH/DIAG INJ IV PUSH: CPT

## 2021-11-02 PROCEDURE — 83690 ASSAY OF LIPASE: CPT

## 2021-11-02 PROCEDURE — 99283 EMERGENCY DEPT VISIT LOW MDM: CPT

## 2021-11-02 PROCEDURE — 85025 COMPLETE CBC W/AUTO DIFF WBC: CPT

## 2021-11-02 PROCEDURE — 74011250637 HC RX REV CODE- 250/637: Performed by: EMERGENCY MEDICINE

## 2021-11-02 PROCEDURE — 80053 COMPREHEN METABOLIC PANEL: CPT

## 2021-11-02 PROCEDURE — 74011000250 HC RX REV CODE- 250: Performed by: EMERGENCY MEDICINE

## 2021-11-02 PROCEDURE — 83735 ASSAY OF MAGNESIUM: CPT

## 2021-11-02 PROCEDURE — 96375 TX/PRO/DX INJ NEW DRUG ADDON: CPT

## 2021-11-02 PROCEDURE — 74011250636 HC RX REV CODE- 250/636: Performed by: EMERGENCY MEDICINE

## 2021-11-02 RX ORDER — POTASSIUM CHLORIDE 20 MEQ/1
40 TABLET, EXTENDED RELEASE ORAL
Status: COMPLETED | OUTPATIENT
Start: 2021-11-02 | End: 2021-11-02

## 2021-11-02 RX ORDER — PROMETHAZINE HYDROCHLORIDE 25 MG/1
25 TABLET ORAL
Qty: 20 TABLET | Refills: 0 | Status: SHIPPED | OUTPATIENT
Start: 2021-11-02 | End: 2022-03-07

## 2021-11-02 RX ORDER — SODIUM CHLORIDE 0.9 % (FLUSH) 0.9 %
5-10 SYRINGE (ML) INJECTION AS NEEDED
Status: DISCONTINUED | OUTPATIENT
Start: 2021-11-02 | End: 2021-11-03 | Stop reason: HOSPADM

## 2021-11-02 RX ORDER — SODIUM CHLORIDE 0.9 % (FLUSH) 0.9 %
5-10 SYRINGE (ML) INJECTION EVERY 8 HOURS
Status: DISCONTINUED | OUTPATIENT
Start: 2021-11-02 | End: 2021-11-03 | Stop reason: HOSPADM

## 2021-11-02 RX ORDER — ONDANSETRON 2 MG/ML
4 INJECTION INTRAMUSCULAR; INTRAVENOUS ONCE
Status: COMPLETED | OUTPATIENT
Start: 2021-11-02 | End: 2021-11-02

## 2021-11-02 RX ADMIN — POTASSIUM CHLORIDE 40 MEQ: 20 TABLET, EXTENDED RELEASE ORAL at 20:26

## 2021-11-02 RX ADMIN — SODIUM CHLORIDE 12.5 MG: 9 INJECTION INTRAMUSCULAR; INTRAVENOUS; SUBCUTANEOUS at 20:33

## 2021-11-02 RX ADMIN — SODIUM CHLORIDE 1000 ML: 900 INJECTION, SOLUTION INTRAVENOUS at 20:26

## 2021-11-02 RX ADMIN — ONDANSETRON 4 MG: 2 INJECTION INTRAMUSCULAR; INTRAVENOUS at 19:19

## 2021-11-02 NOTE — ED TRIAGE NOTES
Arrives with face mask in place. Reports generalized abdominal pain, n/v. Onset today however has been ongoing \"For awhile\". Spouse reports seen multiple times as well as documented established care with GI MD at [facility]. Spouse reports hx barretts esophagus. Denies diarrhea, fever/chills.  Scheduled appt with PMD in AM.

## 2021-11-03 NOTE — ED NOTES
I have reviewed discharge instructions with the patient. The patient verbalized understanding. Patient left ED via Discharge Method: ambulatory to Home with spouse  Opportunity for questions and clarification provided. Patient given 1 scripts. To continue your aftercare when you leave the hospital, you may receive an automated call from our care team to check in on how you are doing. This is a free service and part of our promise to provide the best care and service to meet your aftercare needs.  If you have questions, or wish to unsubscribe from this service please call [phone]. Thank you for Choosing our [facility].

## 2021-11-03 NOTE — DISCHARGE INSTRUCTIONS
Call your gastroenterologist in the morning and schedule a follow-up appointment with them. Start taking the Phenergan as needed for nausea and follow a very bland diet for the least the next 2 to 3 days.

## 2021-11-03 NOTE — ED PROVIDER NOTES
Patient is a 69-year-old female presenting to the emergency department today complaining of nausea and vomiting with epigastric abdominal tenderness. The patient has a history of intractable nausea and vomiting with Reyes's esophagitis. She just recently had an EGD done and was discharged from the hospital at [address] 2 weeks ago. The patient says she never really felt that much better but today it started getting much worse and she had the episodes of vomiting which would not stop with the Zofran she has at home.            Past Medical History:   Diagnosis Date    Arthritis     COVID-19 vaccine series completed     per chart received Kenneth Cole- pt unable to verify type or dates; but states both doses received----instructed to bring card DOS    Dementia ([facility] [address]     per documentation in chart \"deficits in multiple cognitive domains\" executive function and language most affected    Diabetes ([facility] [address]     Type 2- oral medication- has not been checking at home- denies hypo- does not know A1C    Fibromyalgia     GERD (gastroesophageal reflux disease)     Hypertension     Osteoporosis     Poor historian     regarding medications and health history;  manages medications       Past Surgical History:   Procedure Laterality Date    HX BREAST BIOPSY Left 09/25/2019    Stereo Core Bx    HX BREAST BIOPSY Right 09/25/2019    Stereo Core Bx    HX GYN      tubal    HX ORTHOPAEDIC Right     ORIF arm         Family History:   Problem Relation Age of Onset    Breast Cancer Maternal Aunt     Breast Cancer Paternal Aunt     Hypertension Mother     Heart Attack Maternal Grandfather     Cancer Other         maternal side        Social History     Socioeconomic History    Marital status:      Spouse name: Not on file    Number of children: Not on file    Years of education: Not on file    Highest education level: Not on file   Occupational History    Not on file   Tobacco Use    Smoking status: Never Smoker    Smokeless tobacco: Never Used   Substance and Sexual Activity    Alcohol use: Not Currently    Drug use: Never    Sexual activity: Not on file   Other Topics Concern    Not on file   Social History Narrative    Not on file     Social Determinants of Health     Financial Resource Strain:     Difficulty of Paying Living Expenses:    Food Insecurity:     Worried About Running Out of Food in the Last Year:     [address] in the Last Year:    Transportation Needs:     Lack of Transportation (Medical):  Lack of Transportation (Non-Medical):    Physical Activity:     Days of Exercise per Week:     Minutes of Exercise per Session:    Stress:     Feeling of Stress :    Social Connections:     Frequency of Communication with Friends and Family:     Frequency of Social Gatherings with Friends and Family:     Attends [employer] Services:     Active Member of Clubs or Organizations:     Attends Club or Organization Meetings:     Marital Status:    Intimate Partner Violence:     Fear of Current or Ex-Partner:     Emotionally Abused:     Physically Abused:     Sexually Abused: ALLERGIES: Sulfa (sulfonamide antibiotics)    Review of Systems   Gastrointestinal: Positive for abdominal pain, nausea and vomiting. All other systems reviewed and are negative. Vitals:    11/02/21 1913 11/02/21 2128 11/02/21 2209   BP: (!) 157/88 (!) 154/89 (!) 145/83   Pulse: (!) 116     Resp: 20     Temp: 97.5 °F (36.4 °C)     SpO2: 98% 94% 94%   Weight: 63.5 kg (140 lb)     Height: 5' 5\" (1.651 m)              Physical Exam     GENERAL:The patient has Body mass index is 23.3 kg/m². Well-hydrated. VITAL SIGNS: Heart rate, blood pressure, respiratory rate reviewed as recorded in  nurse's notes  EYES: Pupils reactive. Extraocular motion intact. No conjunctival redness or drainage. NECK: Supple, no meningeal signs. Trachea midline. No masses or thyromegaly.   LUNGS: Breath sounds clear and equal bilaterally no accessory muscle use. CHEST: No deformity  CARDIOVASCULAR: Regular rate and rhythm  ABDOMEN: Soft with epigastric tenderness. No palpable masses or organomegaly. No  peritoneal signs. No rigidity. Negative Charlies Double and Rovsing sign  EXTREMITIES: No clubbing or cyanosis. No joint swelling. Normal muscle tone. No  restricted range of motion appreciated. NEUROLOGIC: Sensation is grossly intact. Cranial nerve exam reveals face is  symmetrical, tongue is midline speech is clear. SKIN: No rash or petechiae. Good skin turgor palpated. PSYCHIATRIC: Alert and oriented. Appropriate behavior and judgment. MDM  Number of Diagnoses or Management Options  Diagnosis management comments: Viral infection, gastroenteritis, viral adenitis, pseudomembranous colitis, inflammatory  bowel disease, infectious diarrhea    Abdominal wall pain,     Constipation, fecal impaction, small bowel obstruction, partial small bowel obstruction,  Ileus    UTI, pyelonephritis, renal colic, ureteral stone     Peptic ulcer disease, esophagitis, GERD    Pancreatitis, pancreatic pseudocyst,    hepatic cirrhosis, GI bleed, esophageal varices, poisoning,    gallbladder disease, cholecystitis, diverticulitis, appendicitis, appendicitis with rupture,    ingestion of foreign material         Amount and/or Complexity of Data Reviewed  Clinical lab tests: reviewed and ordered  Tests in the medicine section of CPT®: ordered and reviewed  Decide to obtain previous medical records or to obtain history from someone other than the patient: yes  Obtain history from someone other than the patient: yes  Review and summarize past medical records: yes  Independent visualization of images, tracings, or specimens: yes      ED Course as of Nov 02 2247   Tue Nov 02, 2021 2244 Patient is doing better. She was able to drink 3 glasses of water and take the potassium without throwing up.   I encouraged her to call her gastroenterologist in the morning and schedule a follow-up since she does not have one at this time. She will be given a prescription for Phenergan to be used as needed.     [KH]      ED Course User Index  [KH] Yuliana Capone, DO       Procedures

## 2022-03-19 PROBLEM — N60.91 ATYPICAL DUCTAL HYPERPLASIA OF BOTH BREASTS: Status: ACTIVE | Noted: 2019-11-11

## 2022-03-19 PROBLEM — N60.92 ATYPICAL DUCTAL HYPERPLASIA OF BOTH BREASTS: Status: ACTIVE | Noted: 2019-11-11

## 2022-03-19 PROBLEM — M75.122 COMPLETE ROTATOR CUFF TEAR OF LEFT SHOULDER: Status: ACTIVE | Noted: 2021-07-13

## 2022-03-20 PROBLEM — M25.512 LEFT SHOULDER PAIN: Status: ACTIVE | Noted: 2021-07-13

## 2022-04-25 ENCOUNTER — HOSPITAL ENCOUNTER (OUTPATIENT)
Dept: LAB | Age: 62
Discharge: HOME | End: 2022-04-25
Payer: MEDICARE

## 2022-04-25 DIAGNOSIS — N60.91 ATYPICAL DUCTAL HYPERPLASIA OF BOTH BREASTS: ICD-10-CM

## 2022-04-25 DIAGNOSIS — Z79.811 AROMATASE INHIBITOR USE: ICD-10-CM

## 2022-04-25 DIAGNOSIS — N60.92 ATYPICAL DUCTAL HYPERPLASIA OF BOTH BREASTS: ICD-10-CM

## 2022-04-25 LAB
ALBUMIN SERPL-MCNC: 4.1 G/DL (ref 3.2–4.6)
ALBUMIN/GLOB SERPL: 0.8 {RATIO} (ref 1.2–3.5)
ALP SERPL-CCNC: 58 U/L (ref 50–136)
ALT SERPL-CCNC: 33 U/L (ref 12–65)
ANION GAP SERPL CALC-SCNC: 4 MMOL/L (ref 7–16)
AST SERPL-CCNC: 36 U/L (ref 15–37)
BASOPHILS # BLD: 0 K/UL (ref 0–0.2)
BASOPHILS NFR BLD: 1 % (ref 0–2)
BILIRUB SERPL-MCNC: 0.3 MG/DL (ref 0.2–1.1)
BUN SERPL-MCNC: 17 MG/DL (ref 8–23)
CALCIUM SERPL-MCNC: 10 MG/DL (ref 8.3–10.4)
CHLORIDE SERPL-SCNC: 101 MMOL/L (ref 98–107)
CO2 SERPL-SCNC: 29 MMOL/L (ref 21–32)
CREAT SERPL-MCNC: 1.2 MG/DL (ref 0.6–1)
DIFFERENTIAL METHOD BLD: ABNORMAL
EOSINOPHIL # BLD: 0.1 K/UL (ref 0–0.8)
EOSINOPHIL NFR BLD: 1 % (ref 0.5–7.8)
ERYTHROCYTE [DISTWIDTH] IN BLOOD BY AUTOMATED COUNT: 16.5 % (ref 11.9–14.6)
GLOBULIN SER CALC-MCNC: 5 G/DL (ref 2.3–3.5)
GLUCOSE SERPL-MCNC: 108 MG/DL (ref 65–100)
HCT VFR BLD AUTO: 39.8 % (ref 35.8–46.3)
HGB BLD-MCNC: 12.8 G/DL (ref 11.7–15.4)
IMM GRANULOCYTES # BLD AUTO: 0 K/UL (ref 0–0.5)
IMM GRANULOCYTES NFR BLD AUTO: 0 % (ref 0–5)
LYMPHOCYTES # BLD: 2.9 K/UL (ref 0.5–4.6)
LYMPHOCYTES NFR BLD: 43 % (ref 13–44)
MCH RBC QN AUTO: 27.3 PG (ref 26.1–32.9)
MCHC RBC AUTO-ENTMCNC: 32.2 G/DL (ref 31.4–35)
MCV RBC AUTO: 84.9 FL (ref 79.6–97.8)
MONOCYTES # BLD: 0.4 K/UL (ref 0.1–1.3)
MONOCYTES NFR BLD: 6 % (ref 4–12)
NEUTS SEG # BLD: 3.4 K/UL (ref 1.7–8.2)
NEUTS SEG NFR BLD: 49 % (ref 43–78)
NRBC # BLD: 0 K/UL (ref 0–0.2)
PLATELET # BLD AUTO: 227 K/UL (ref 150–450)
PMV BLD AUTO: 10.7 FL (ref 9.4–12.3)
POTASSIUM SERPL-SCNC: 4 MMOL/L (ref 3.5–5.1)
PROT SERPL-MCNC: 9.1 G/DL (ref 6.3–8.2)
RBC # BLD AUTO: 4.69 M/UL (ref 4.05–5.2)
SODIUM SERPL-SCNC: 134 MMOL/L (ref 136–145)
WBC # BLD AUTO: 6.8 K/UL (ref 4.3–11.1)

## 2022-04-25 PROCEDURE — 85025 COMPLETE CBC W/AUTO DIFF WBC: CPT

## 2022-04-25 PROCEDURE — 36415 COLL VENOUS BLD VENIPUNCTURE: CPT

## 2022-04-25 PROCEDURE — 80053 COMPREHEN METABOLIC PANEL: CPT

## 2022-05-11 ENCOUNTER — HOSPITAL ENCOUNTER (OUTPATIENT)
Dept: MAMMOGRAPHY | Age: 62
Discharge: HOME | End: 2022-05-11
Attending: INTERNAL MEDICINE
Payer: MEDICARE

## 2022-05-11 DIAGNOSIS — Z12.31 SCREENING MAMMOGRAM FOR BREAST CANCER: ICD-10-CM

## 2022-05-11 PROCEDURE — 77067 SCR MAMMO BI INCL CAD: CPT

## 2022-06-23 DIAGNOSIS — N60.91 ATYPICAL DUCTAL HYPERPLASIA OF BOTH BREASTS: Primary | ICD-10-CM

## 2022-06-23 DIAGNOSIS — N60.92 ATYPICAL DUCTAL HYPERPLASIA OF BOTH BREASTS: Primary | ICD-10-CM

## 2022-06-27 ENCOUNTER — OFFICE VISIT (OUTPATIENT)
Dept: ONCOLOGY | Age: 62
End: 2022-06-27
Payer: MEDICARE

## 2022-06-27 ENCOUNTER — HOSPITAL ENCOUNTER (OUTPATIENT)
Dept: LAB | Age: 62
Discharge: HOME | End: 2022-06-30
Payer: MEDICARE

## 2022-06-27 VITALS
HEART RATE: 87 BPM | BODY MASS INDEX: 21.8 KG/M2 | RESPIRATION RATE: 16 BRPM | DIASTOLIC BLOOD PRESSURE: 78 MMHG | SYSTOLIC BLOOD PRESSURE: 134 MMHG | OXYGEN SATURATION: 99 % | TEMPERATURE: 98.4 F | WEIGHT: 131 LBS

## 2022-06-27 DIAGNOSIS — G31.83 LEWY BODY DEMENTIA WITHOUT BEHAVIORAL DISTURBANCE (HCC): ICD-10-CM

## 2022-06-27 DIAGNOSIS — R53.83 FATIGUE, UNSPECIFIED TYPE: ICD-10-CM

## 2022-06-27 DIAGNOSIS — R63.4 WEIGHT LOSS: ICD-10-CM

## 2022-06-27 DIAGNOSIS — N60.91 ATYPICAL DUCTAL HYPERPLASIA OF BOTH BREASTS: ICD-10-CM

## 2022-06-27 DIAGNOSIS — N60.92 ATYPICAL DUCTAL HYPERPLASIA OF BOTH BREASTS: Primary | ICD-10-CM

## 2022-06-27 DIAGNOSIS — N60.92 ATYPICAL DUCTAL HYPERPLASIA OF BOTH BREASTS: ICD-10-CM

## 2022-06-27 DIAGNOSIS — R92.2 DENSE BREAST TISSUE ON MAMMOGRAM: ICD-10-CM

## 2022-06-27 DIAGNOSIS — N60.91 ATYPICAL DUCTAL HYPERPLASIA OF BOTH BREASTS: Primary | ICD-10-CM

## 2022-06-27 DIAGNOSIS — F02.80 LEWY BODY DEMENTIA WITHOUT BEHAVIORAL DISTURBANCE (HCC): ICD-10-CM

## 2022-06-27 LAB
ALBUMIN SERPL-MCNC: 3.8 G/DL (ref 3.2–4.6)
ALBUMIN/GLOB SERPL: 0.8 {RATIO} (ref 1.2–3.5)
ALP SERPL-CCNC: 56 U/L (ref 50–136)
ALT SERPL-CCNC: 32 U/L (ref 12–65)
ANION GAP SERPL CALC-SCNC: 5 MMOL/L (ref 7–16)
AST SERPL-CCNC: 31 U/L (ref 15–37)
BASOPHILS # BLD: 0 K/UL (ref 0–0.2)
BASOPHILS NFR BLD: 1 % (ref 0–2)
BILIRUB SERPL-MCNC: 0.4 MG/DL (ref 0.2–1.1)
BUN SERPL-MCNC: 12 MG/DL (ref 8–23)
CALCIUM SERPL-MCNC: 9.8 MG/DL (ref 8.3–10.4)
CHLORIDE SERPL-SCNC: 102 MMOL/L (ref 98–107)
CO2 SERPL-SCNC: 30 MMOL/L (ref 21–32)
CREAT SERPL-MCNC: 0.9 MG/DL (ref 0.6–1)
DIFFERENTIAL METHOD BLD: ABNORMAL
EOSINOPHIL # BLD: 0.1 K/UL (ref 0–0.8)
EOSINOPHIL NFR BLD: 1 % (ref 0.5–7.8)
ERYTHROCYTE [DISTWIDTH] IN BLOOD BY AUTOMATED COUNT: 16 % (ref 11.9–14.6)
GLOBULIN SER CALC-MCNC: 4.6 G/DL (ref 2.3–3.5)
GLUCOSE SERPL-MCNC: 87 MG/DL (ref 65–100)
HCT VFR BLD AUTO: 39.3 % (ref 35.8–46.3)
HGB BLD-MCNC: 12.7 G/DL (ref 11.7–15.4)
IMM GRANULOCYTES # BLD AUTO: 0 K/UL (ref 0–0.5)
IMM GRANULOCYTES NFR BLD AUTO: 0 % (ref 0–5)
LYMPHOCYTES # BLD: 2.6 K/UL (ref 0.5–4.6)
LYMPHOCYTES NFR BLD: 35 % (ref 13–44)
MCH RBC QN AUTO: 28.3 PG (ref 26.1–32.9)
MCHC RBC AUTO-ENTMCNC: 32.3 G/DL (ref 31.4–35)
MCV RBC AUTO: 87.7 FL (ref 79.6–97.8)
MONOCYTES # BLD: 0.5 K/UL (ref 0.1–1.3)
MONOCYTES NFR BLD: 7 % (ref 4–12)
NEUTS SEG # BLD: 4.1 K/UL (ref 1.7–8.2)
NEUTS SEG NFR BLD: 56 % (ref 43–78)
NRBC # BLD: 0 K/UL (ref 0–0.2)
PLATELET # BLD AUTO: 233 K/UL (ref 150–450)
PMV BLD AUTO: 10.6 FL (ref 9.4–12.3)
POTASSIUM SERPL-SCNC: 4.4 MMOL/L (ref 3.5–5.1)
PROT SERPL-MCNC: 8.4 G/DL (ref 6.3–8.2)
RBC # BLD AUTO: 4.48 M/UL (ref 4.05–5.2)
SODIUM SERPL-SCNC: 137 MMOL/L (ref 136–145)
WBC # BLD AUTO: 7.3 K/UL (ref 4.3–11.1)

## 2022-06-27 PROCEDURE — G8420 CALC BMI NORM PARAMETERS: HCPCS | Performed by: NURSE PRACTITIONER

## 2022-06-27 PROCEDURE — 80053 COMPREHEN METABOLIC PANEL: CPT

## 2022-06-27 PROCEDURE — 3017F COLORECTAL CA SCREEN DOC REV: CPT | Performed by: NURSE PRACTITIONER

## 2022-06-27 PROCEDURE — 36415 COLL VENOUS BLD VENIPUNCTURE: CPT

## 2022-06-27 PROCEDURE — 85025 COMPLETE CBC W/AUTO DIFF WBC: CPT

## 2022-06-27 PROCEDURE — G8427 DOCREV CUR MEDS BY ELIG CLIN: HCPCS | Performed by: NURSE PRACTITIONER

## 2022-06-27 PROCEDURE — 1036F TOBACCO NON-USER: CPT | Performed by: NURSE PRACTITIONER

## 2022-06-27 PROCEDURE — 99214 OFFICE O/P EST MOD 30 MIN: CPT | Performed by: NURSE PRACTITIONER

## 2022-06-27 ASSESSMENT — PATIENT HEALTH QUESTIONNAIRE - PHQ9
SUM OF ALL RESPONSES TO PHQ QUESTIONS 1-9: 0
2. FEELING DOWN, DEPRESSED OR HOPELESS: 0
SUM OF ALL RESPONSES TO PHQ QUESTIONS 1-9: 0

## 2022-06-27 NOTE — PROGRESS NOTES
Chief Complaint   Patient presents with    Follow-up     Data Source: Patient, [facility] record. 06/27/2022    Mary Zhao [id number]    60 y.o. 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 (sees [facility], felt to have gastric polyps, pedersen's esophagus & GERD - plan for repeat EGD 4/20). Underwent screening mammogram 8/21/2019 showing developing calcification in both breasts. In addition note made of heterogeneously dense breast parenchyma. Underwent bilateral breast diagnostic mammogram 9/17/2019 showing  new clusters of indeterminate calcifications in the upper outer quadrant of right breast and in the upper medial left breast.  Stereotactic biopsy is recommended. Needle core biopsies performed 9/25/2019: Left breast calcification at 11:00 showing fibrocystic mastopathy with atypical intraductal hyperplasia with microcalcifications. Right breast calcification at 11:00 showed fibrocystic mastopathy having atypical intraductal hyperplasia with micro calcifications. Patient underwent bilateral breast MRI 10/2/2019 showing post-biopsy changes in both breasts. No associated enhancing mass seen. No adenopathy noted. Patient now referred to medical oncology for further management. Interval History:  6/27/2022: She is here today with  for routine follow up for ADH. Since last visit she restarted the Femara but after 10 days her  reports that he felt it was interacting with her dementia medications as she was reversing in her mentation.   She is currently recovering from a mouth every 6 hours as needed      amLODIPine (NORVASC) 10 MG tablet TAKE ONE TABLET BY MOUTH ONE TIME DAILY      brexpiprazole (REXULTI) 1 MG TABS tablet Take 1 mg by mouth      vitamin D 25 MCG (1000 UT) CAPS Take 1,000 Units by mouth daily      ferrous sulfate (IRON 325) 325 (65 Fe) MG tablet Take 325 mg by mouth      glimepiride (AMARYL) 1 MG tablet Take 1 mg by mouth 2 times daily      insulin glargine (LANTUS SOLOSTAR) 100 UNIT/ML injection pen Inject into the skin      letrozole (FEMARA) 2.5 MG tablet Take 2.5 mg by mouth daily      metoprolol succinate (TOPROL XL) 25 MG extended release tablet Take 25 mg by mouth daily      ondansetron (ZOFRAN) 4 MG tablet Take 4 mg by mouth every 8 hours as needed      pantoprazole (PROTONIX) 40 MG tablet Take 40 mg by mouth 2 times daily      potassium gluconate 550 mg tablet Take 99 mg by mouth 2 times daily      traZODone (DESYREL) 50 MG tablet TAKE 1 TO 2 TABLETS BY MOUTH EVERY DAY AT BEDTIME AS NEEDED       No current facility-administered medications for this visit. Social History     Socioeconomic History    Marital status:      Spouse name: Not on file    Number of children: Not on file    Years of education: Not on file    Highest education level: Not on file   Occupational History    Not on file   Tobacco Use    Smoking status: Never Smoker    Smokeless tobacco: Never Used   Substance and Sexual Activity    Alcohol use: Not Currently    Drug use: Never    Sexual activity: Not on file   Other Topics Concern    Not on file   Social History Narrative    Not on file     Social Determinants of Health     Financial Resource Strain:     Difficulty of Paying Living Expenses: Not on file   Food Insecurity:     Worried About Running Out of Food in the Last Year: Not on file    Bucky of Food in the Last Year: Not on file   Transportation Needs:     Lack of Transportation (Medical):  Not on file    Lack of Transportation (Non-Medical): Not on file   Physical Activity:     Days of Exercise per Week: Not on file    Minutes of Exercise per Session: Not on file   Stress:     Feeling of Stress : Not on file   Social Connections:     Frequency of Communication with Friends and Family: Not on file    Frequency of Social Gatherings with Friends and Family: Not on file    Attends [employer] Services: Not on file    Active Member of [address] [place] [address] or Organizations: Not on file    Attends Club or Organization Meetings: Not on file    Marital Status: Not on file   Intimate Partner Violence:     Fear of Current or Ex-Partner: Not on file    Emotionally Abused: Not on file    Physically Abused: Not on file    Sexually Abused: Not on file   Housing Stability:     Unable to Pay for Housing in the Last Year: Not on file    Number of [place] in the Last Year: Not on file    Unstable Housing in the Last Year: Not on file     Family History   Problem Relation Age of Onset    Heart Attack Maternal Grandfather     Cancer Other         maternal side     Hypertension Mother     Breast Cancer Paternal Aunt     Breast Cancer Maternal Aunt      Allergies   Allergen Reactions    Sulfa Antibiotics Itching       PHYSICAL EXAMINATION:  General Appearance: Healthy appearing patient in no acute distress  Vitals reviewed. Visit Vitals  Vitals:    06/27/22 1256   BP: 134/78   Pulse: 87   Resp: 16   Temp: 98.4 °F (36.9 °C)   SpO2: 99%   Weight: 131 lb (59.4 kg)     HEENT: Neck is supple with no thyromegaly or JVD noted. Lungs/Thorax: Clear to auscultation, no accessory muscles of respiration being used. Heart: Regular rate and rhythm, normal S1, S2, no appreciable murmurs, rubs, gallops  Abdomen: Soft, nontender, bowel sounds present  Extremeties: Good pulses bilaterally, no peripheral edema. Skin: Normal skin tone with no rash, petechiae, ecchymosis noted.   Musculoskeletal: No pain on palpation over bony prominence, no edema, no evidence of gout, no joint or bony deformity  Neurologic: Grossly intact    LABS/IMAGING:  Hospital Outpatient Visit on 06/27/2022   Component Date Value Ref Range Status    WBC 06/27/2022 7.3  4.3 - 11.1 K/uL Final    RBC 06/27/2022 4.48  4.05 - 5.2 M/uL Final    Hemoglobin 06/27/2022 12.7  11.7 - 15.4 g/dL Final    Hematocrit 06/27/2022 39.3  35.8 - 46.3 % Final    MCV 06/27/2022 87.7  79.6 - 97.8 FL Final    MCH 06/27/2022 28.3  26.1 - 32.9 PG Final    MCHC 06/27/2022 32.3  31.4 - 35.0 g/dL Final    RDW 06/27/2022 16.0* 11.9 - 14.6 % Final    Platelets 80/38/8592 233  150 - 450 K/uL Final    MPV 06/27/2022 10.6  9.4 - 12.3 FL Final    nRBC 06/27/2022 0.00  0.0 - 0.2 K/uL Final    **Note: Absolute NRBC parameter is now reported with Hemogram**    Seg Neutrophils 06/27/2022 56  43 - 78 % Final    Lymphocytes 06/27/2022 35  13 - 44 % Final    Monocytes 06/27/2022 7  4.0 - 12.0 % Final    Eosinophils % 06/27/2022 1  0.5 - 7.8 % Final    Basophils 06/27/2022 1  0.0 - 2.0 % Final    Immature Granulocytes 06/27/2022 0  0.0 - 5.0 % Final    Segs Absolute 06/27/2022 4.1  1.7 - 8.2 K/UL Final    Absolute Lymph # 06/27/2022 2.6  0.5 - 4.6 K/UL Final    Absolute Mono # 06/27/2022 0.5  0.1 - 1.3 K/UL Final    Absolute Eos # 06/27/2022 0.1  0.0 - 0.8 K/UL Final    Basophils Absolute 06/27/2022 0.0  0.0 - 0.2 K/UL Final    Absolute Immature Granulocyte 06/27/2022 0.0  0.0 - 0.5 K/UL Final    Differential Type 06/27/2022 AUTOMATED    Final    Sodium 06/27/2022 137  136 - 145 mmol/L Final    Potassium 06/27/2022 4.4  3.5 - 5.1 mmol/L Final    Chloride 06/27/2022 102  98 - 107 mmol/L Final    CO2 06/27/2022 30  21 - 32 mmol/L Final    Anion Gap 06/27/2022 5* 7 - 16 mmol/L Final    Glucose 06/27/2022 87  65 - 100 mg/dL Final    BUN 06/27/2022 12  8 - 23 MG/DL Final    CREATININE 06/27/2022 0.90  0.6 - 1.0 MG/DL Final    GFR  06/27/2022 >60  >60 ml/min/1.73m2 Final    GFR Non- 06/27/2022 >60  >60 ml/min/1.73m2 Final    Comment:      Estimated GFR is calculated using the Modification of Diet in Renal Disease (MDRD) Study equation, reported for both  Americans (GFRAA) and non- Americans (GFRNA), and normalized to 1.73m2 body surface area. The physician must decide which value applies to the patient. The MDRD study equation should only be used in individuals age 25 or older. It has not been validated for the following: pregnant women, patients with serious comorbid conditions,or on certain medications, or persons with extremes of body size, muscle mass, or nutritional status.  Calcium 06/27/2022 9.8  8.3 - 10.4 MG/DL Final    Total Bilirubin 06/27/2022 0.4  0.2 - 1.1 MG/DL Final    ALT 06/27/2022 32  12 - 65 U/L Final    AST 06/27/2022 31  15 - 37 U/L Final    Alk Phosphatase 06/27/2022 56  50 - 136 U/L Final    Total Protein 06/27/2022 8.4* 6.3 - 8.2 g/dL Final    Albumin 06/27/2022 3.8  3.2 - 4.6 g/dL Final    Globulin 06/27/2022 4.6* 2.3 - 3.5 g/dL Final    Albumin/Globulin Ratio 06/27/2022 0.8* 1.2 - 3.5   Final     Above results reviewed with patient. 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 Underwent screening mammogram 8/21/2019 showing developing calcification in both breasts. In addition note made of heterogeneously dense breast parenchyma.   Underwent bilateral breast diagnostic mammogram 9/17/2019 showing  new clusters of indeterminate calcifications in the upper outer quadrant of right breast and in the upper medial left breast.  Stereotactic biopsy is recommended. Needle core biopsies performed 9/25/2019: Left breast calcification at 11:00 showing fibrocystic mastopathy with atypical intraductal hyperplasia with microcalcifications. Right breast calcification at 11:00 showed fibrocystic mastopathy having atypical intraductal hyperplasia with micro calcifications. Patient underwent bilateral breast MRI 10/2/2019 showing post-biopsy changes in both breasts. No associated enhancing mass seen. No adenopathy noted. Patient now referred to medical oncology for further management. Middle aged lady w/ b/l breast ADH: Diagnosis, prognosis and management including risk reduction strategies discussed in detail. She appears interested in endocrine therapy. Will plan for Arimidex initiation. 12/5/2019: Patient was seen by surgery, and she is not interested in excisional biopsy, given lack of associated mass/lesion on MRI, surgery is agreeable w/ this. Same impression today. Patient has been on Arimidex since last visit, tolerating well, some hot flashes. She is scheduled for DEXA scan 12/26/2019. She notes some lower abdominal discomfort for about 1 week, she advised she is advised to monitor this over the next few days if pain does not resolve or gets worse in the next few weeks then she will let us know in which case we can proceed with an L-spine MRI. She will return to us in 3 months time 3/20 with labs, bilateral breast mammogram.  Follow-up results of DEXA scan. 6/10/2020: Notes some arms and legs cramping over the last few months which she describes as significant. Doubt related to Arimidex however will change to Femara to see if any benefit. Also advised to consider OTC Mg supplement.   She did undergo bilateral breast mammogram 2/20 which was unremarkable except for possible postbiopsy hematoma changes on the left side, with recommendation to repeat a quick interval six-month follow-up ipsilateral left breast mammogram, this is now scheduled for 8/20 along with bilateral breast MRI. Labs today with some dyslipidemia, will give patient copy, advised to follow-up with her primary care in relation to this. We will see her back in 4 months time. RTC in 4 months with NP (follow-up tolerance to Femara, left breast diagnostic mammogram, bilateral breast MRI) and in 8 months with MD with labs, bilateral breast mammogram.    2/8/2021: Reports herself doing well, has remained on Femara regularly. Some arthralgias of her extremities continue which she attributes to arthritis. Labs unremarkable. She did undergo left sided diagnostic mammogram plus bilateral breast MRI back in 8/20 which were unremarkable. She is now due for bilateral screening mammogram 3/21. We will plan for bilateral annual mammogram 6 months thereafter. Continue monitoring. 4/25/2022: Appears to have had a complicated course since last visit including multiple hospitalizations related to nausea and electrolyte disturbances. She is also had left shoulder orthopedic surgery 7/21. 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 She has been off Femara for about 5 months or so as this taken off multiple medications as possible etiology of her GI symptoms. Patient also follows with geriatric service at [facility]. Bilateral breast MRI 9/22 were negative. She is past due for her bilateral annual screening mammograms, will order and follow-up results. Discussed options of going back on Femara, patient agreeable. She will let us know if develops nausea once back on Femara. She will be due for bilateral annual breast MRI in 5 months time. 6/27/2022: She is here today with  for routine follow up for ADH.   Since last visit she restarted the Femara but after 10 days her  reports that he felt it was interacting with her dementia medications as she was reversing in her mentation. She is currently recovering from a GI bug with nausea and diarrhea and this is after recent Covid infection. She attributes her 12# weight loss to those illnesses vs true appetite changes. Fatigue is present. No cough, shortness of breath, or edema. No breast changes or concerns. Mammogram on 5/11 was benign. Labs reviewed. She will remain off AI due to intolerance. Plan to return for follow up with MRI breast prior in 3 months. 1. B/l breast ADH  2. Osteopenia    PLAN:  - As above. On Arimidex (11/19-6/20). Changed to Femara 6/20 (due to reported cramps): Restarted for only 10 days at end of April 22,  reports interacting with dementia meds therefore stopped. - Osteopenia on Dexa scan 12/19: now on Vit D, repeat in 2 years 12/21   - B/l annual mammogram plus MRI (dense parenchyma)  - Dyslipidemia: per pcp. RTC in 3 months with MD with labs, bilateral breast MRI.           Emmanuel Spear) 1976 [address] [place] [address] Hematology and Oncology  [address]  Office : [phone]  Fax : [phone]

## 2022-09-06 ENCOUNTER — HOSPITAL ENCOUNTER (OUTPATIENT)
Dept: MRI IMAGING | Age: 62
Discharge: HOME | End: 2022-09-09
Payer: MEDICARE

## 2022-09-06 DIAGNOSIS — N60.91 ATYPICAL DUCTAL HYPERPLASIA OF BOTH BREASTS: ICD-10-CM

## 2022-09-06 DIAGNOSIS — N60.92 ATYPICAL DUCTAL HYPERPLASIA OF BOTH BREASTS: ICD-10-CM

## 2022-09-06 DIAGNOSIS — R92.2 DENSE BREAST TISSUE ON MAMMOGRAM: ICD-10-CM

## 2022-09-06 PROCEDURE — 6360000004 HC RX CONTRAST MEDICATION: Performed by: NURSE PRACTITIONER

## 2022-09-06 PROCEDURE — A9579 GAD-BASE MR CONTRAST NOS,1ML: HCPCS | Performed by: NURSE PRACTITIONER

## 2022-09-06 PROCEDURE — 2580000003 HC RX 258: Performed by: NURSE PRACTITIONER

## 2022-09-06 PROCEDURE — C8908 MRI W/O FOL W/CONT, BREAST,: HCPCS

## 2022-09-06 RX ORDER — SODIUM CHLORIDE 0.9 % (FLUSH) 0.9 %
30 SYRINGE (ML) INJECTION AS NEEDED
Status: DISCONTINUED | OUTPATIENT
Start: 2022-09-06 | End: 2022-09-10 | Stop reason: HOSPADM

## 2022-09-06 RX ADMIN — SODIUM CHLORIDE, PRESERVATIVE FREE 30 ML: 5 INJECTION INTRAVENOUS at 13:55

## 2022-09-06 RX ADMIN — GADOTERIDOL 12 ML: 279.3 INJECTION, SOLUTION INTRAVENOUS at 13:54

## 2022-09-07 ENCOUNTER — OFFICE VISIT (OUTPATIENT)
Dept: NEUROLOGY | Age: 62
End: 2022-09-07
Payer: MEDICARE

## 2022-09-07 VITALS
SYSTOLIC BLOOD PRESSURE: 129 MMHG | DIASTOLIC BLOOD PRESSURE: 73 MMHG | HEART RATE: 80 BPM | BODY MASS INDEX: 22.49 KG/M2 | WEIGHT: 135 LBS | HEIGHT: 65 IN

## 2022-09-07 DIAGNOSIS — F51.01 PRIMARY INSOMNIA: ICD-10-CM

## 2022-09-07 DIAGNOSIS — R26.9 GAIT DISTURBANCE: ICD-10-CM

## 2022-09-07 DIAGNOSIS — F41.9 ANXIETY: ICD-10-CM

## 2022-09-07 DIAGNOSIS — G31.83 DEMENTIA WITH LEWY BODIES (CODE): Primary | ICD-10-CM

## 2022-09-07 PROCEDURE — 1036F TOBACCO NON-USER: CPT | Performed by: PSYCHIATRY & NEUROLOGY

## 2022-09-07 PROCEDURE — G8420 CALC BMI NORM PARAMETERS: HCPCS | Performed by: PSYCHIATRY & NEUROLOGY

## 2022-09-07 PROCEDURE — 99214 OFFICE O/P EST MOD 30 MIN: CPT | Performed by: PSYCHIATRY & NEUROLOGY

## 2022-09-07 PROCEDURE — G8427 DOCREV CUR MEDS BY ELIG CLIN: HCPCS | Performed by: PSYCHIATRY & NEUROLOGY

## 2022-09-07 PROCEDURE — 3017F COLORECTAL CA SCREEN DOC REV: CPT | Performed by: PSYCHIATRY & NEUROLOGY

## 2022-09-07 NOTE — PROGRESS NOTES
Thomas Banegas  [address], [address] [facility], [address] [place] [address]  Phone: [phone] Fax [phone]  Roni Cardoza MD      Patient: Jeremy Alvarado  Provider: Roni Cardoza MD    CC:   Chief Complaint   Patient presents with    Follow-up    Neurologic Problem     Dementia      Referring Provider:    History of Present Illness:     Jeremy Alvarado is a 60 y.o. RH female who presents for follow-up of dementia with Lewy bodies. She is accompanied by her spouse. Patient presents for further evaluation of progressive cognitive impairment and findings of parkinsonism. Chart review indicates that she has been seen with [facility] geriatric medicine and continues to be followed, last seen June 2022. These notes have been reviewed. Current relevant medications include:   Abilify 5 mg at night  Sertraline 50 mg daily  Trazodone 50 to 100 mg at night     Previous medication trials include: quetiapine, risperidone, olanzapine, brexpiprazole     Patient presents today for follow-up. Overall things have been relatively stable. She continues to have moderate cognitive deficits including short-term memory loss and forgetfulness. Recall that she had several hospital admissions last year for refractory nausea and vomiting with several hospital with recurrent electrolyte disturbances including hypokalemia and hypomagnesemia. It appears that cognitively she was doing much more poorly at that time and had since gradually improved since these issues resolved. It was also noted that she had been experiencing significant visual hallucinations and other psychotic symptoms that have since improved. Fortunately it appears she has been relatively stable over the last few months. She is not currently driving. There has been noted parkinsonian features including some hypophonia, diminished facial expression, and a gait disturbance that does resemble that of a parkinsonian gait.   We have not started any levodopa as it has been noted that these features have been relatively mild and not causing significant day-to-day disruption. There has been no tremor. There have been some behaviors at night resembling REM behavior disorder and she is known to have a fluctuating course of attention and concentration throughout the day. She has an additional history of bipolar disorder with anxiety and depression although she currently reports her mood is relatively well controlled. Chronic issues with insomnia are noted though nonspecific. Somewhat complicated by nocturia which she experiences approximately 3 times a night. Review of Systems:   Review of Systems   Constitutional:  Negative for fever. HENT:  Positive for hearing loss and voice change. Eyes:  Negative for visual disturbance. Respiratory:  Negative for cough. Cardiovascular:  Negative for chest pain. Gastrointestinal:  Negative for abdominal pain. Genitourinary:  Negative for dysuria. Musculoskeletal:  Positive for gait problem. Skin:  Negative for rash. Allergic/Immunologic: Negative for immunocompromised state. Neurological:  Positive for tremors, speech difficulty and weakness. Psychiatric/Behavioral:  Positive for confusion and decreased concentration. Lab/Imaging Review:   I REVIEWED PERTINENT LABS, IMAGES, AND REPORTS WITH THE PATIENT PERSONALLY, DIRECTLY AND FULLY. THE MOST PERTINENT FINDINGS ARE NOTED BELOW:    MRI Brain January 2022:  FINDINGS:   Moderately severe motion artifact. No evidence of acute or early subacute infarction, acute intracranial hemorrhage, intracranial mass effect, or hydrocephalus. No evidence of significant white matter disease for the patient's age with a few scattered frontal lobe predominant T2/FLAIR hyperintense or white matter signal foci noted. No evidence of abnormal intracranial mineralization or remote blood product deposition.  Tiny right para midline posterior frontal developmental venous anomaly. Paranasal sinus mucosal thickening may be inflammatory. No air-fluid levels. Partial right mastoid air cell opacification. No discrete infiltrate bone marrow lesion of the calvarium visualized. IMPRESSION:   Moderately severe motion artifact without acute intracranial abnormality or clear findings to suggest a specific dementia syndrome. MRI Brain November 2020:  FINDINGS:   The cerebellar tonsils are in a normal position. There is no acute infarction, intracranial hemorrhage, hydrocephalus, intra-axial mass, or extra-axial hematoma. On the T2-weighted and FLAIR sequences, there are scattered punctate white matter hyperintensities appear unchanged. This is not an uncommon finding which may be present with asymptomatic patients, with migraine headaches or with mild chronic small vessel ischemic disease. The mastoid air cells and paranasal sinuses are clear where imaged. There is no abnormal parenchymal or leptomeningeal enhancement. IMPRESSION:   White matter findings compatible with migraine headaches or mild  chronic small vessel ischemic disease. Past Medical History:     Past medical history, surgical history, social history, family history, medications, and allergies were reviewed and updated as appropriate.      PAST MEDICAL HISTORY:  Past Medical History:   Diagnosis Date    Arthritis     COVID-19 vaccine series completed     per chart received Lissette Law- pt unable to verify type or dates; but states both doses received----instructed to bring card DOS    Dementia ([facility] [address]     per documentation in chart \"deficits in multiple cognitive domains\" executive function and language most affected    Diabetes ([address]     Type 2- oral medication- has not been checking at home- denies hypo- does not know A1C    Fibromyalgia     GERD (gastroesophageal reflux disease)     Hypertension     Osteoporosis     Poor historian     regarding medications and health history;  manages medications     PAST SURGICAL HISTORY:   Past Surgical History:   Procedure Laterality Date    BREAST BIOPSY Right 09/25/2019    Stereo Core Bx    BREAST BIOPSY Left 09/25/2019    Stereo Core Bx    GYN      tubal    FANY STEROTACTIC LOC BREAST BIOPSY LEFT Left 9/25/2019    FANY STEROTACTIC LOC BREAST BIOPSY LEFT 9/25/2019 [facility]    FANY STEROTACTIC LOC BREAST BIOPSY RIGHT Right 9/25/2019    FANY STEROTACTIC LOC BREAST BIOPSY RIGHT 9/25/2019 [facility]    ORTHOPEDIC SURGERY Right     ORIF arm     FAMILY HISTORY:  Family History   Problem Relation Age of Onset    Heart Attack Maternal Grandfather     Cancer Other         maternal side     Hypertension Mother     Breast Cancer Paternal Aunt     Breast Cancer Maternal Aunt       SOCIAL HISTORY:  Social History     Socioeconomic History    Marital status:      Spouse name: None    Number of children: None    Years of education: None    Highest education level: None   Tobacco Use    Smoking status: Never    Smokeless tobacco: Never   Substance and Sexual Activity    Alcohol use: Not Currently    Drug use: Never       Medications/Allergies:     MEDICATIONS:   Outpatient Encounter Medications as of 9/7/2022   Medication Sig Dispense Refill    acetaminophen (TYLENOL) 500 MG tablet Take by mouth every 6 hours as needed      amLODIPine (NORVASC) 10 MG tablet TAKE ONE TABLET BY MOUTH ONE TIME DAILY      vitamin D 25 MCG (1000 UT) CAPS Take 1,000 Units by mouth daily      ferrous sulfate (IRON 325) 325 (65 Fe) MG tablet Take 325 mg by mouth      glimepiride (AMARYL) 1 MG tablet Take 1 mg by mouth 2 times daily      insulin glargine (LANTUS SOLOSTAR) 100 UNIT/ML injection pen Inject into the skin      metoprolol succinate (TOPROL XL) 25 MG extended release tablet Take 25 mg by mouth daily      ondansetron (ZOFRAN) 4 MG tablet Take 4 mg by mouth every 8 hours as needed      pantoprazole (PROTONIX) 40 MG tablet Take 40 mg by mouth 2 times daily potassium gluconate 550 mg tablet Take 99 mg by mouth 2 times daily      traZODone (DESYREL) 50 MG tablet TAKE 1 TO 2 TABLETS BY MOUTH EVERY DAY AT BEDTIME AS NEEDED      [DISCONTINUED] brexpiprazole (REXULTI) 1 MG TABS tablet Take 1 mg by mouth (Patient not taking: Reported on 9/7/2022)      [DISCONTINUED] letrozole (FEMARA) 2.5 MG tablet Take 2.5 mg by mouth daily       Facility-Administered Encounter Medications as of 9/7/2022   Medication Dose Route Frequency Provider Last Rate Last Admin    sodium chloride flush 0.9 % injection 30 mL  30 mL IntraVENous PRN JAYLEN Singh - CNP   30 mL at 09/06/22 1355     ALLERGIES:  Allergies   Allergen Reactions    Sulfa Antibiotics Itching       Physical Exam:     /73   Pulse 80   Ht 5' 5\" (1.651 m)   Wt 135 lb (61.2 kg)   BMI 22.47 kg/m²     General Exam:  General: Pleasant female in no apparent distress. HEENT: Normocephalic, atraumatic. Sclera anicteric. Oropharynx clear. Neck: Supple without masses  Cardiovascular: Regular rate and rhythm. No carotid bruits. Lungs: Non-labored breathing. Abdomen: Soft, nontender, nondistended. Extremities: Peripheral pulses intact. No edema and no rashes. Neurological Exam:      MS/Language/Speech:  Alert. Oriented to person, place, and today's date. She has not able to state the months backwards accurately. She is able to perform serial 2 subtractions from 20 but nothing more difficult. She does have difficulty drawing a clock with numbers and hands in the appropriate positions. Cranial Nerves: PERRL. Eye movements full. No nystagmus. Facial activation was slightly diminished but symmetric. Tongue and palate were midline. Shoulder shrug with slight delay on the left as compared to the right. Motor: Strength was full in all proximal and distal muscle groups. No cogwheeling or rigidity noted in the upper or lower extremities. Abnormal Movements: No resting tremor.   No postural or action tremor. No dystonic or dyskinetic movements. Sensory: Normal to light touch throughout. Cerebellar: No ataxia or dysmetria with finger-nose-finger bilaterally. Reflexes (R/L): Biceps (1+/1+), Brachioradialis (1+/1+), Patellar (1+/1+), Ankle (0+/0+). Leigh's was negative and plantar responses were flexor. Gait: She is slow to rise from her chair but can do so without assistance. Posture is mildly stooped. Romberg is negative. Gait is mildly slowed with diminished stride length and there is also mildly diminished arm swing bilaterally. No freezing of gait. Assessment and Plan:     Arvin Ybarra is a 60 y.o. female who presents with the following issues:     Dee Del Valle was seen today for follow-up and neurologic problem. Diagnoses and all orders for this visit:    Dementia with Lewy bodies (CODE) ([facility] [address]    Gait disturbance    Anxiety    Primary insomnia      Patient presents for follow-up and continued management of progressive cognitive impairment with mild parkinsonian features which has raised suspicion for Lewy body dementia. It we have discussed the diagnosis in detail. Overall things are reported to be relatively stable. He does appear that many of the psychotic symptoms were worse late last year during a time in which she had multiple hospitalizations. We do need to continue to monitor for any worsening symptoms. She may certainly benefit from cholinesterase inhibitors in the future but they have elected to decline any new medications on account that her cognitive deficits have been relatively stable. Anxiety is reported to be fairly well controlled. Deferred any other medications for insomnia. I do not feel strongly that any levodopa trials are warranted at this time as her parkinsonian features are fairly mild and not causing any significant functional limitations.   There may also be a higher risk of adverse effects with dopaminergic medications particularly worsening psychotic symptoms. Trials of formal physical therapy will be considered should gait symptoms worsen. We will continue to monitor at this time and they are advised to contact us should symptoms change or worsen. Advised continued follow-up with [facility] geriatric medicine. We will see her back in 6 months. Signature: Jacqueline Mason MD      Date:  9/8/2022    [facility]   [address]  Ph: [phone]  Fax: [phone]         I have personally interviewed and examined Mrs. Roxi Moses and I have personally reviewed all relevant records including labs and imaging as noted above. I have written all aspects of this note. More than 50% of this time was used for counseling regarding my diagnosis, prognosis, and plans for management. Total visit time: 35 minutes.

## 2022-09-08 ASSESSMENT — ENCOUNTER SYMPTOMS
VOICE CHANGE: 1
ABDOMINAL PAIN: 0
COUGH: 0

## 2022-09-15 DIAGNOSIS — N60.92 ATYPICAL DUCTAL HYPERPLASIA OF BOTH BREASTS: Primary | ICD-10-CM

## 2022-09-15 DIAGNOSIS — N60.91 ATYPICAL DUCTAL HYPERPLASIA OF BOTH BREASTS: Primary | ICD-10-CM

## 2022-09-15 DIAGNOSIS — R53.83 FATIGUE, UNSPECIFIED TYPE: ICD-10-CM

## 2022-09-19 ENCOUNTER — HOSPITAL ENCOUNTER (OUTPATIENT)
Dept: LAB | Age: 62
Discharge: HOME | End: 2022-09-22
Payer: MEDICARE

## 2022-09-19 ENCOUNTER — OFFICE VISIT (OUTPATIENT)
Dept: ONCOLOGY | Age: 62
End: 2022-09-19
Payer: MEDICARE

## 2022-09-19 VITALS
BODY MASS INDEX: 21.84 KG/M2 | RESPIRATION RATE: 14 BRPM | WEIGHT: 131.1 LBS | HEIGHT: 65 IN | SYSTOLIC BLOOD PRESSURE: 114 MMHG | DIASTOLIC BLOOD PRESSURE: 80 MMHG | HEART RATE: 104 BPM | OXYGEN SATURATION: 95 % | TEMPERATURE: 97.6 F

## 2022-09-19 DIAGNOSIS — N60.92 ATYPICAL DUCTAL HYPERPLASIA OF BOTH BREASTS: ICD-10-CM

## 2022-09-19 DIAGNOSIS — N60.92 ATYPICAL DUCTAL HYPERPLASIA OF BOTH BREASTS: Primary | ICD-10-CM

## 2022-09-19 DIAGNOSIS — Z12.31 ENCOUNTER FOR SCREENING MAMMOGRAM FOR BREAST CANCER: ICD-10-CM

## 2022-09-19 DIAGNOSIS — R92.2 DENSE BREAST TISSUE ON MAMMOGRAM: ICD-10-CM

## 2022-09-19 DIAGNOSIS — N60.91 ATYPICAL DUCTAL HYPERPLASIA OF BOTH BREASTS: ICD-10-CM

## 2022-09-19 DIAGNOSIS — N60.91 ATYPICAL DUCTAL HYPERPLASIA OF BOTH BREASTS: Primary | ICD-10-CM

## 2022-09-19 DIAGNOSIS — R53.83 FATIGUE, UNSPECIFIED TYPE: ICD-10-CM

## 2022-09-19 LAB
ALBUMIN SERPL-MCNC: 3.6 G/DL (ref 3.2–4.6)
ALBUMIN/GLOB SERPL: 0.7 {RATIO} (ref 1.2–3.5)
ALP SERPL-CCNC: 78 U/L (ref 50–136)
ALT SERPL-CCNC: 30 U/L (ref 12–65)
ANION GAP SERPL CALC-SCNC: 4 MMOL/L (ref 4–13)
AST SERPL-CCNC: 31 U/L (ref 15–37)
BASOPHILS # BLD: 0 K/UL (ref 0–0.2)
BASOPHILS NFR BLD: 1 % (ref 0–2)
BILIRUB SERPL-MCNC: 0.3 MG/DL (ref 0.2–1.1)
BUN SERPL-MCNC: 17 MG/DL (ref 8–23)
CALCIUM SERPL-MCNC: 10 MG/DL (ref 8.3–10.4)
CHLORIDE SERPL-SCNC: 100 MMOL/L (ref 101–110)
CO2 SERPL-SCNC: 29 MMOL/L (ref 21–32)
CREAT SERPL-MCNC: 1 MG/DL (ref 0.6–1)
DIFFERENTIAL METHOD BLD: NORMAL
EOSINOPHIL # BLD: 0.1 K/UL (ref 0–0.8)
EOSINOPHIL NFR BLD: 1 % (ref 0.5–7.8)
ERYTHROCYTE [DISTWIDTH] IN BLOOD BY AUTOMATED COUNT: 14.6 % (ref 11.9–14.6)
GLOBULIN SER CALC-MCNC: 5.3 G/DL (ref 2.3–3.5)
GLUCOSE SERPL-MCNC: 116 MG/DL (ref 65–100)
HCT VFR BLD AUTO: 40.4 % (ref 35.8–46.3)
HGB BLD-MCNC: 13.3 G/DL (ref 11.7–15.4)
IMM GRANULOCYTES # BLD AUTO: 0 K/UL (ref 0–0.5)
IMM GRANULOCYTES NFR BLD AUTO: 0 % (ref 0–5)
LYMPHOCYTES # BLD: 2.3 K/UL (ref 0.5–4.6)
LYMPHOCYTES NFR BLD: 27 % (ref 13–44)
MCH RBC QN AUTO: 28.9 PG (ref 26.1–32.9)
MCHC RBC AUTO-ENTMCNC: 32.9 G/DL (ref 31.4–35)
MCV RBC AUTO: 87.6 FL (ref 79.6–97.8)
MONOCYTES # BLD: 0.7 K/UL (ref 0.1–1.3)
MONOCYTES NFR BLD: 8 % (ref 4–12)
NEUTS SEG # BLD: 5.2 K/UL (ref 1.7–8.2)
NEUTS SEG NFR BLD: 63 % (ref 43–78)
NRBC # BLD: 0 K/UL (ref 0–0.2)
PLATELET # BLD AUTO: 260 K/UL (ref 150–450)
PMV BLD AUTO: 10.2 FL (ref 9.4–12.3)
POTASSIUM SERPL-SCNC: 3.6 MMOL/L (ref 3.5–5.1)
PROT SERPL-MCNC: 8.9 G/DL (ref 6.3–8.2)
RBC # BLD AUTO: 4.61 M/UL (ref 4.05–5.2)
SODIUM SERPL-SCNC: 133 MMOL/L (ref 136–145)
WBC # BLD AUTO: 8.3 K/UL (ref 4.3–11.1)

## 2022-09-19 PROCEDURE — 3017F COLORECTAL CA SCREEN DOC REV: CPT | Performed by: INTERNAL MEDICINE

## 2022-09-19 PROCEDURE — 85025 COMPLETE CBC W/AUTO DIFF WBC: CPT

## 2022-09-19 PROCEDURE — 1036F TOBACCO NON-USER: CPT | Performed by: INTERNAL MEDICINE

## 2022-09-19 PROCEDURE — G8420 CALC BMI NORM PARAMETERS: HCPCS | Performed by: INTERNAL MEDICINE

## 2022-09-19 PROCEDURE — G8427 DOCREV CUR MEDS BY ELIG CLIN: HCPCS | Performed by: INTERNAL MEDICINE

## 2022-09-19 PROCEDURE — 99214 OFFICE O/P EST MOD 30 MIN: CPT | Performed by: INTERNAL MEDICINE

## 2022-09-19 PROCEDURE — 36415 COLL VENOUS BLD VENIPUNCTURE: CPT

## 2022-09-19 PROCEDURE — 80053 COMPREHEN METABOLIC PANEL: CPT

## 2022-09-19 RX ORDER — LOPERAMIDE HYDROCHLORIDE 2 MG/1
2 CAPSULE ORAL 2 TIMES DAILY
COMMUNITY

## 2022-09-19 ASSESSMENT — PATIENT HEALTH QUESTIONNAIRE - PHQ9
SUM OF ALL RESPONSES TO PHQ QUESTIONS 1-9: 2
2. FEELING DOWN, DEPRESSED OR HOPELESS: 1
1. LITTLE INTEREST OR PLEASURE IN DOING THINGS: 1
SUM OF ALL RESPONSES TO PHQ QUESTIONS 1-9: 2
SUM OF ALL RESPONSES TO PHQ9 QUESTIONS 1 & 2: 2
SUM OF ALL RESPONSES TO PHQ QUESTIONS 1-9: 2
SUM OF ALL RESPONSES TO PHQ QUESTIONS 1-9: 2

## 2022-09-19 NOTE — PROGRESS NOTES
Data Source: Patient, [facility] record. 9/19/2022    2:28 PM    Stephane Howe [id number]    60 y.o. 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 Underwent screening mammogram 8/21/2019 showing developing calcification in both breasts. In addition note made of heterogeneously dense breast parenchyma. Underwent bilateral breast diagnostic mammogram 9/17/2019 showing  new clusters of indeterminate calcifications in the upper outer quadrant of right breast and in the upper medial left breast.  Stereotactic biopsy is recommended. Needle core biopsies performed 9/25/2019: Left breast calcification at 11:00 showing fibrocystic mastopathy with atypical intraductal hyperplasia with microcalcifications. Right breast calcification at 11:00 showed fibrocystic mastopathy having atypical intraductal hyperplasia with micro calcifications. Patient underwent bilateral breast MRI 10/2/2019 showing post-biopsy changes in both breasts. No associated enhancing mass seen. No adenopathy noted. Patient now referred to medical oncology for further management. Interval History:  9/19/2022: Since last visit, patient took Femara for a few days but then stopped as she felt poorly on it. Continues to see geriatrics. Reports now being managed for Lewy body dementia. Denies any new lumps or bumps,  accompanies. Exam unremarkable. Routine labs unremarkable.   Recent bilateral breast MRI without concerning pen Inject into the skin      metoprolol succinate (TOPROL XL) 25 MG extended release tablet Take 25 mg by mouth daily      ondansetron (ZOFRAN) 4 MG tablet Take 4 mg by mouth every 8 hours as needed      pantoprazole (PROTONIX) 40 MG tablet Take 40 mg by mouth 2 times daily      potassium gluconate 550 mg tablet Take 99 mg by mouth 2 times daily      traZODone (DESYREL) 50 MG tablet TAKE 1 TO 2 TABLETS BY MOUTH EVERY DAY AT BEDTIME AS NEEDED       No current facility-administered medications for this visit. Social History     Socioeconomic History    Marital status:      Spouse name: None    Number of children: None    Years of education: None    Highest education level: None   Tobacco Use    Smoking status: Never    Smokeless tobacco: Never   Substance and Sexual Activity    Alcohol use: Not Currently    Drug use: Never       Family History   Problem Relation Age of Onset    Heart Attack Maternal Grandfather     Cancer Other         maternal side     Hypertension Mother     Breast Cancer Paternal Aunt     Breast Cancer Maternal Aunt        Allergies   Allergen Reactions    Sulfa Antibiotics Itching       PHYSICAL EXAMINATION:  General Appearance: Healthy appearing patient in no acute distress  Vitals reviewed. /80 (Site: Left Upper Arm, Position: Sitting, Cuff Size: Medium Adult)   Pulse (!) 104   Temp 97.6 °F (36.4 °C) (Oral)   Resp 14   Ht 5' 5\" (1.651 m)   Wt 131 lb 1.6 oz (59.5 kg)   SpO2 95%   BMI 21.82 kg/m²   HEENT: No oral or pharyngeal masses, ulceration or thrush noted, no sinus tenderness. Neck is supple with no thyromegaly or JVD noted. Lymph Nodes: No lymphadenopathy noted in the occipital, pre and post auricular, cervical, supra and infraclavicular, axillary, epitrochlear, inguinal, and popliteal region. Breasts: No palpable masses, nipple discharge or skin retraction  Lungs/Thorax: Clear to auscultation, no accessory muscles of respiration being used.   Heart: Regular rate and rhythm, normal S1, S2, no appreciable murmurs, rubs, gallops  Abdomen: Soft, nontender, bowel sounds present, no appreciable hepatosplenomegaly, no palpable masses  Extremeties: Good pulses bilaterally, no peripheral edema. Skin: Normal skin tone with no rash, petechiae, ecchymosis noted. Musculoskeletal: No pain on palpation over bony prominence, no edema, no evidence of gout, no joint or bony deformity  Neurologic: Grossly intact    LABS/IMAGING:    Lab Results   Component Value Date/Time    WBC 8.3 09/19/2022 01:38 PM    HGB 13.3 09/19/2022 01:38 PM    HCT 40.4 09/19/2022 01:38 PM     09/19/2022 01:38 PM    MCV 87.6 09/19/2022 01:38 PM       Lab Results   Component Value Date/Time     09/19/2022 01:38 PM    K 3.6 09/19/2022 01:38 PM     09/19/2022 01:38 PM    CO2 29 09/19/2022 01:38 PM    BUN 17 09/19/2022 01:38 PM    GFRAA >60 09/19/2022 01:38 PM    GLOB 5.3 09/19/2022 01:38 PM    ALT 30 09/19/2022 01:38 PM         Above results reviewed with patient. 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 Underwent screening mammogram 8/21/2019 showing developing calcification in both breasts. In addition note made of heterogeneously dense breast parenchyma. Underwent bilateral breast diagnostic mammogram 9/17/2019 showing  new clusters of indeterminate calcifications in the upper outer quadrant of right breast and in the upper medial left breast.  Stereotactic biopsy is recommended.   Needle core biopsies performed 9/25/2019: Left breast calcification at 11:00 showing fibrocystic mastopathy with atypical intraductal hyperplasia with microcalcifications. Right breast calcification at 11:00 showed fibrocystic mastopathy having atypical intraductal hyperplasia with micro calcifications. Patient underwent bilateral breast MRI 10/2/2019 showing post-biopsy changes in both breasts. No associated enhancing mass seen. No adenopathy noted. Patient now referred to medical oncology for further management. Middle aged lady w/ b/l breast ADH: Diagnosis, prognosis and management including risk reduction strategies discussed in detail. She appears interested in endocrine therapy. Will plan for Arimidex initiation. 12/5/2019: Patient was seen by surgery, and she is not interested in excisional biopsy, given lack of associated mass/lesion on MRI, surgery is agreeable w/ this. Same impression today. Patient has been on Arimidex since last visit, tolerating well, some hot flashes. She is scheduled for DEXA scan 12/26/2019. She notes some lower abdominal discomfort for about 1 week, she advised she is advised to monitor this over the next few days if pain does not resolve or gets worse in the next few weeks then she will let us know in which case we can proceed with an L-spine MRI. She will return to us in 3 months time 3/20 with labs, bilateral breast mammogram.  Follow-up results of DEXA scan. 6/10/2020: Notes some arms and legs cramping over the last few months which she describes as significant. Doubt related to Arimidex however will change to Femara to see if any benefit. Also advised to consider OTC Mg supplement. She did undergo bilateral breast mammogram 2/20 which was unremarkable except for possible postbiopsy hematoma changes on the left side, with recommendation to repeat a quick interval six-month follow-up ipsilateral left breast mammogram, this is now scheduled for 8/20 along with bilateral breast MRI.   Labs today with some dyslipidemia, will give patient copy, advised to follow-up with her primary care in relation to this. We will see her back in 4 months time. RTC in 4 months with NP (follow-up tolerance to Femara, left breast diagnostic mammogram, bilateral breast MRI) and in 8 months with MD with labs, bilateral breast mammogram.    2/8/2021: Reports herself doing well, has remained on Femara regularly. Some arthralgias of her extremities continue which she attributes to arthritis. Labs unremarkable. She did undergo left sided diagnostic mammogram plus bilateral breast MRI back in 8/20 which were unremarkable. She is now due for bilateral screening mammogram 3/21. We will plan for bilateral annual mammogram 6 months thereafter. Continue monitoring. 4/25/2022: Appears to have had a complicated course since last visit including multiple hospitalizations related to nausea and electrolyte disturbances. She is also had left shoulder orthopedic surgery 7/21. 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 She has been off Femara for about 5 months or so as this taken off multiple medications as possible etiology of her GI symptoms. Patient also follows with geriatric service at [facility]. Bilateral breast MRI 9/22 were negative. She is past due for her bilateral annual screening mammograms, will order and follow-up results. Discussed options of going back on Femara, patient agreeable. She will let us know if develops nausea once back on Femara. She will be due for bilateral annual breast MRI in 5 months time. 9/19/2022: Since last visit, patient took Femara for a few days but then stopped as she felt poorly on it. Continues to see geriatrics. Reports now being managed for Lewy body dementia. Denies any new lumps or bumps,  accompanies. Exam unremarkable. Routine labs unremarkable. Recent bilateral breast MRI without concerning findings. Patient reassured, we will monitor off AI moving forward. We will see her back next in 5/23 with bilateral breast mammogram.        1. B/l breast ADH  2. Osteopenia    PLAN:  - As above. Simple monitoring. Was on Arimidex (11/19-6/20 but stopped due to cramps). Took Femara from 6/20-11/21 (due to tolerance issues)  - Osteopenia on Dexa scan 12/19: now on Vit D, asked to discuss further care w pcp. - B/l annual mammogram plus MRI (dense parenchyma)    RTC in 5/23 with MD with labs, bilateral breast mammogram.    Thank you Dr Asha Briscoe for allowing us to paticipate in care of this pleasant patient.  Please call w/ any farhad Fuentes MD  [facility]  Hematology Oncology  [address] [address]  Office : [phone]  Fax : [phone]

## 2022-09-19 NOTE — PATIENT INSTRUCTIONS
Patient Instructions from Today's Visit    Reason for Visit:  Follow up    Plan: Your MRI looked good. Make sure you talk to your primary care provider about your osteopenia    Follow Up:   Follow up in     Recent Lab Results:  Hospital Outpatient Visit on 09/19/2022   Component Date Value Ref Range Status    WBC 09/19/2022 8.3  4.3 - 11.1 K/uL Final    RBC 09/19/2022 4.61  4.05 - 5.2 M/uL Final    Hemoglobin 09/19/2022 13.3  11.7 - 15.4 g/dL Final    Hematocrit 09/19/2022 40.4  35.8 - 46.3 % Final    MCV 09/19/2022 87.6  79.6 - 97.8 FL Final    MCH 09/19/2022 28.9  26.1 - 32.9 PG Final    MCHC 09/19/2022 32.9  31.4 - 35.0 g/dL Final    RDW 09/19/2022 14.6  11.9 - 14.6 % Final    Platelets 28/98/4874 260  150 - 450 K/uL Final    MPV 09/19/2022 10.2  9.4 - 12.3 FL Final    nRBC 09/19/2022 0.00  0.0 - 0.2 K/uL Final    **Note: Absolute NRBC parameter is now reported with Hemogram**    Differential Type 09/19/2022 AUTOMATED    Final    Seg Neutrophils 09/19/2022 63  43 - 78 % Final    Lymphocytes 09/19/2022 27  13 - 44 % Final    Monocytes 09/19/2022 8  4.0 - 12.0 % Final    Eosinophils % 09/19/2022 1  0.5 - 7.8 % Final    Basophils 09/19/2022 1  0.0 - 2.0 % Final    Immature Granulocytes 09/19/2022 0  0.0 - 5.0 % Final    Segs Absolute 09/19/2022 5.2  1.7 - 8.2 K/UL Final    Absolute Lymph # 09/19/2022 2.3  0.5 - 4.6 K/UL Final    Absolute Mono # 09/19/2022 0.7  0.1 - 1.3 K/UL Final    Absolute Eos # 09/19/2022 0.1  0.0 - 0.8 K/UL Final    Basophils Absolute 09/19/2022 0.0  0.0 - 0.2 K/UL Final    Absolute Immature Granulocyte 09/19/2022 0.0  0.0 - 0.5 K/UL Final    Sodium 09/19/2022 133 (A) 136 - 145 mmol/L Final    Potassium 09/19/2022 3.6  3.5 - 5.1 mmol/L Final    Chloride 09/19/2022 100 (A) 101 - 110 mmol/L Final    CO2 09/19/2022 29  21 - 32 mmol/L Final    Anion Gap 09/19/2022 4  4 - 13 mmol/L Final    Glucose 09/19/2022 116 (A) 65 - 100 mg/dL Final    BUN 09/19/2022 17  8 - 23 MG/DL Final    Creatinine 09/19/2022 1.00  0.6 - 1.0 MG/DL Final    GFR  09/19/2022 >60  >60 ml/min/1.73m2 Final    GFR Non- 09/19/2022 60 (A) >60 ml/min/1.73m2 Final    Comment:      Estimated GFR is calculated using the Modification of Diet in Renal Disease (MDRD) Study equation, reported for both  Americans (GFRAA) and non- Americans (GFRNA), and normalized to 1.73m2 body surface area. The physician must decide which value applies to the patient. The MDRD study equation should only be used in individuals age 25 or older. It has not been validated for the following: pregnant women, patients with serious comorbid conditions,or on certain medications, or persons with extremes of body size, muscle mass, or nutritional status. Calcium 09/19/2022 10.0  8.3 - 10.4 MG/DL Final    Total Bilirubin 09/19/2022 0.3  0.2 - 1.1 MG/DL Final    ALT 09/19/2022 30  12 - 65 U/L Final    AST 09/19/2022 31  15 - 37 U/L Final    Alk Phosphatase 09/19/2022 78  50 - 136 U/L Final    Total Protein 09/19/2022 8.9 (A) 6.3 - 8.2 g/dL Final    Albumin 09/19/2022 3.6  3.2 - 4.6 g/dL Final    Globulin 09/19/2022 5.3 (A) 2.3 - 3.5 g/dL Final    Albumin/Globulin Ratio 09/19/2022 0.7 (A) 1.2 - 3.5   Final        Treatment Summary has been discussed and given to patient: n/a        -------------------------------------------------------------------------------------------------------------------  Please call our office at [phone] if you have any  of the following symptoms:   Fever of 100.5 or greater  Chills  Shortness of breath  Swelling or pain in one leg    After office hours an answering service is available and will contact a provider for emergencies or if you are experiencing any of the above symptoms. Patient did express an interest in My Chart. My Chart log in information explained on the after visit summary printout at the [facility] desk.     Arlet Diop RN

## 2023-03-07 ENCOUNTER — OFFICE VISIT (OUTPATIENT)
Dept: NEUROLOGY | Age: 63
End: 2023-03-07
Payer: COMMERCIAL

## 2023-03-07 VITALS
SYSTOLIC BLOOD PRESSURE: 124 MMHG | HEART RATE: 99 BPM | OXYGEN SATURATION: 94 % | BODY MASS INDEX: 23.26 KG/M2 | HEIGHT: 65 IN | DIASTOLIC BLOOD PRESSURE: 82 MMHG | WEIGHT: 139.6 LBS

## 2023-03-07 DIAGNOSIS — G31.83 MODERATE LEWY BODY DEMENTIA WITH ANXIETY (HCC): Primary | ICD-10-CM

## 2023-03-07 DIAGNOSIS — G89.29 CHRONIC BILATERAL LOW BACK PAIN WITHOUT SCIATICA: ICD-10-CM

## 2023-03-07 DIAGNOSIS — R26.9 GAIT DISTURBANCE: ICD-10-CM

## 2023-03-07 DIAGNOSIS — F51.01 PRIMARY INSOMNIA: ICD-10-CM

## 2023-03-07 DIAGNOSIS — F02.B4 MODERATE LEWY BODY DEMENTIA WITH ANXIETY (HCC): Primary | ICD-10-CM

## 2023-03-07 DIAGNOSIS — M54.50 CHRONIC BILATERAL LOW BACK PAIN WITHOUT SCIATICA: ICD-10-CM

## 2023-03-07 PROCEDURE — 99212 OFFICE O/P EST SF 10 MIN: CPT | Performed by: PSYCHIATRY & NEUROLOGY

## 2023-03-07 RX ORDER — ARIPIPRAZOLE 10 MG/1
TABLET ORAL
COMMUNITY
Start: 2023-03-03

## 2023-03-07 RX ORDER — HYDROCODONE BITARTRATE AND ACETAMINOPHEN 5; 325 MG/1; MG/1
TABLET ORAL
COMMUNITY
Start: 2022-12-09

## 2023-03-07 RX ORDER — DULOXETIN HYDROCHLORIDE 30 MG/1
CAPSULE, DELAYED RELEASE ORAL
COMMUNITY
Start: 2023-02-24

## 2023-03-07 ASSESSMENT — ENCOUNTER SYMPTOMS
VOICE CHANGE: 1
ABDOMINAL PAIN: 0
COUGH: 0

## 2023-03-07 NOTE — PROGRESS NOTES
[facility]   [address], [address]  Phone: [phone] Fax [phone]  Najma Collado MD      Patient: Alexis Michel  Provider: Najma Collado MD    CC:   Chief Complaint   Patient presents with    Follow-up     Dementia with Lewy bodies      Referring Provider:    History of Present Illness:     Alexis Michel is a 60 y.o. RH female who presents for follow-up of probable Lewy body dementia. She is accompanied by her spouse. She was last seen in September 2022. Patient presents for further evaluation of progressive cognitive impairment and findings of parkinsonism. Chart review indicates that she has been seen with [facility] geriatric medicine and continues to be followed, last seen June 2022. These notes have been reviewed. Current relevant medications include:   Abilify 5 mg at night  Sertraline 50 mg daily  Trazodone 50 to 100 mg at night     Previous medication trials include: quetiapine, risperidone, olanzapine, brexpiprazole     Patient presents today for follow-up. Overall it appears things have been relatively stable. She continues to have moderate cognitive deficits including short-term memory loss and forgetfulness. She continues to live at home with her spouse. She is not currently driving. There has been some noted parkinsonian features including some hypophonia, diminished facial expression, and a gait disturbance that does resemble that of a parkinsonian gait. We have not started any levodopa as it has been noted that these features have been relatively mild and not causing significant day-to-day disruption. There has been no tremor. She has a history of some visual hallucinations and other psychotic symptoms and had occurred in the setting of a hospitalization at which point she was having refractory nausea and vomiting with recurrent electrolyte disturbances. These improved and have not recurred since.   She did have a more recent appendectomy in January, with course being complicated by some evidence of delirium although she quickly recovered after a few days. There have been some behaviors at night resembling REM behavior disorder and she is known to have a fluctuating course of attention and concentration throughout the day. She has an additional history of bipolar disorder with anxiety and depression although she currently reports her mood is relatively well controlled. Chronic issues with insomnia are noted though nonspecific. Somewhat complicated by nocturia which she experiences approximately 3 times a night. Recently seen at [facility] geriatric clinic in January. These notes were reviewed. MoCA 12/30 at the time of the visit. No medication changes were made. She does have some difficulty with chronic low back pain. She reports x-rays done at her primary care's office show degenerative disc disease of the lumbar spine but we are not able to review the images. Only conservative management was recommended. Review of Systems:   Review of Systems   Constitutional:  Negative for fever. HENT:  Positive for hearing loss and voice change. Eyes:  Negative for visual disturbance. Respiratory:  Negative for cough. Cardiovascular:  Negative for chest pain. Gastrointestinal:  Negative for abdominal pain. Genitourinary:  Negative for dysuria. Musculoskeletal:  Positive for gait problem. Skin:  Negative for rash. Allergic/Immunologic: Negative for immunocompromised state. Neurological:  Positive for tremors, speech difficulty and weakness. Psychiatric/Behavioral:  Positive for confusion and decreased concentration. Lab/Imaging Review:   I REVIEWED PERTINENT LABS, IMAGES, AND REPORTS WITH THE PATIENT PERSONALLY, DIRECTLY AND FULLY. THE MOST PERTINENT FINDINGS ARE NOTED BELOW:    MRI Brain January 2022:  FINDINGS:   Moderately severe motion artifact.  No evidence of acute or early subacute infarction, acute intracranial hemorrhage, intracranial mass effect, or hydrocephalus. No evidence of significant white matter disease for the patient's age with a few scattered frontal lobe predominant T2/FLAIR hyperintense or white matter signal foci noted. No evidence of abnormal intracranial mineralization or remote blood product deposition. Tiny right para midline posterior frontal developmental venous anomaly. Paranasal sinus mucosal thickening may be inflammatory. No air-fluid levels. Partial right mastoid air cell opacification. No discrete infiltrate bone marrow lesion of the calvarium visualized. IMPRESSION:   Moderately severe motion artifact without acute intracranial abnormality or clear findings to suggest a specific dementia syndrome. MRI Brain November 2020:  FINDINGS:   The cerebellar tonsils are in a normal position. There is no acute infarction, intracranial hemorrhage, hydrocephalus, intra-axial mass, or extra-axial hematoma. On the T2-weighted and FLAIR sequences, there are scattered punctate white matter hyperintensities appear unchanged. This is not an uncommon finding which may be present with asymptomatic patients, with migraine headaches or with mild chronic small vessel ischemic disease. The mastoid air cells and paranasal sinuses are clear where imaged. There is no abnormal parenchymal or leptomeningeal enhancement. IMPRESSION:   White matter findings compatible with migraine headaches or mild  chronic small vessel ischemic disease. Past Medical History:     Past medical history, surgical history, social history, family history, medications, and allergies were reviewed and updated as appropriate.      PAST MEDICAL HISTORY:  Past Medical History:   Diagnosis Date    Arthritis     COVID-19 vaccine series completed     per chart received Bladimir Blank- pt unable to verify type or dates; but states both doses received----instructed to bring card DOS    Dementia ([facility] [address]     per documentation in chart \"deficits in multiple cognitive domains\" executive function and language most affected    Diabetes ([address]     Type 2- oral medication- has not been checking at home- denies hypo- does not know A1C    Fibromyalgia     GERD (gastroesophageal reflux disease)     Hypertension     Osteoporosis     Poor historian     regarding medications and health history;  manages medications     PAST SURGICAL HISTORY:   Past Surgical History:   Procedure Laterality Date    BREAST BIOPSY Right 09/25/2019    Stereo Core Bx    BREAST BIOPSY Left 09/25/2019    Stereo Core Bx    GYN      tubal    FANY STEROTACTIC LOC BREAST BIOPSY LEFT Left 9/25/2019    FANY STEROTACTIC LOC BREAST BIOPSY LEFT 9/25/2019 [facility]    FANY STEROTACTIC LOC BREAST BIOPSY RIGHT Right 9/25/2019    FANY STEROTACTIC LOC BREAST BIOPSY RIGHT 9/25/2019 [facility]    ORTHOPEDIC SURGERY Right     ORIF arm     FAMILY HISTORY:  Family History   Problem Relation Age of Onset    Heart Attack Maternal Grandfather     Cancer Other         maternal side     Hypertension Mother     Breast Cancer Paternal Aunt     Breast Cancer Maternal Aunt       SOCIAL HISTORY:  Social History     Socioeconomic History    Marital status:      Spouse name: None    Number of children: None    Years of education: None    Highest education level: None   Tobacco Use    Smoking status: Never    Smokeless tobacco: Never   Substance and Sexual Activity    Alcohol use: Not Currently    Drug use: Never       Medications/Allergies:     MEDICATIONS:   Outpatient Encounter Medications as of 3/7/2023   Medication Sig Dispense Refill    ARIPiprazole (ABILIFY) 10 MG tablet TAKE 1 & 1/2 (ONE & ONE-HALF) TABLETS BY MOUTH ONCE DAILY      DULoxetine (CYMBALTA) 30 MG extended release capsule TAKE 1 CAPSULE BY MOUTH IN THE MORNING      HYDROcodone-acetaminophen (NORCO) 5-325 MG per tablet TAKE 1 TABLET BY MOUTH EVERY 8 HOURS AS NEEDED FOR PAIN IN PALLIATIVE CARE PATIENT      loperamide (IMODIUM) 2 MG capsule Take 2 mg by mouth 2 times daily      acetaminophen (TYLENOL) 500 MG tablet Take by mouth every 6 hours as needed      amLODIPine (NORVASC) 10 MG tablet TAKE ONE TABLET BY MOUTH ONE TIME DAILY      vitamin D 25 MCG (1000 UT) CAPS Take 1,000 Units by mouth daily      ferrous sulfate (IRON 325) 325 (65 Fe) MG tablet Take 325 mg by mouth      glimepiride (AMARYL) 1 MG tablet Take 1 mg by mouth 2 times daily      insulin glargine (LANTUS SOLOSTAR) 100 UNIT/ML injection pen Inject into the skin      metoprolol succinate (TOPROL XL) 25 MG extended release tablet Take 25 mg by mouth daily      ondansetron (ZOFRAN) 4 MG tablet Take 4 mg by mouth every 8 hours as needed      potassium gluconate 550 mg tablet Take 99 mg by mouth 2 times daily      traZODone (DESYREL) 50 MG tablet TAKE 1 TO 2 TABLETS BY MOUTH EVERY DAY AT BEDTIME AS NEEDED      pantoprazole (PROTONIX) 40 MG tablet Take 40 mg by mouth 2 times daily       No facility-administered encounter medications on file as of 3/7/2023. ALLERGIES:  Allergies   Allergen Reactions    Sulfa Antibiotics Itching       Physical Exam:     /82   Pulse 99   Ht 5' 5\" (1.651 m)   Wt 139 lb 9.6 oz (63.3 kg)   SpO2 94%   BMI 23.23 kg/m²     General Exam:  General: Pleasant female in no apparent distress. HEENT: Normocephalic, atraumatic. Sclera anicteric. Oropharynx clear. Neck: Supple without masses  Cardiovascular: Regular rate and rhythm. No carotid bruits. Lungs: Non-labored breathing. Abdomen: Soft, nontender, nondistended. Extremities: Peripheral pulses intact. No edema and no rashes. Neurological Exam:      MS/Language/Speech:  Alert. Oriented to person, place, and today's date. She has not able to state the months backwards accurately. She is able to perform serial two subtractions from 20 but nothing more difficult.   She does have difficulty drawing a clock with numbers and hands in the appropriate positions. Cranial Nerves: PERRL. Eye movements full. No nystagmus. Facial activation was slightly diminished but symmetric. Tongue and palate were midline. Shoulder shrug with slight delay on the left as compared to the right. Motor: Strength was full in all proximal and distal muscle groups. No cogwheeling or rigidity noted in the upper or lower extremities. Abnormal Movements: No resting tremor. No postural or action tremor. No dystonic or dyskinetic movements. Sensory: Normal to light touch throughout. Cerebellar: No ataxia or dysmetria with finger-nose-finger bilaterally. Reflexes (R/L): Biceps (1+/1+), Brachioradialis (1+/1+), Patellar (1+/1+), Ankle (0+/0+). Leigh's was negative and plantar responses were flexor. Gait: She is slow to rise from her chair but can do so without assistance. Posture is mildly stooped. Romberg is negative. Gait is mildly slowed with diminished stride length and there is also mildly diminished arm swing bilaterally. No freezing of gait. Assessment and Plan:     Stephane Howe is a 60 y.o. female who presents with the following issues:     Epi Garcias was seen today for follow-up. Diagnoses and all orders for this visit:    Moderate Lewy body dementia with anxiety ([facility] [address]  -     [address] - Physical Therapy, [facility]    Primary insomnia    Gait disturbance  -     [address] - Physical Therapy, [facility]    Chronic bilateral low back pain without sciatica  -     [address] - Physical Therapy, [facility]      Patient presents for follow-up and continued management of progressive cognitive impairment with mild parkinsonian features which has raised suspicion for Lewy body dementia. Overall things are reported to be relatively stable.   It does appear that many of the psychotic symptoms were worse late last year during a time in which she had multiple hospitalizations and can recur in the setting of acute illness or hospitalization. We do need to continue to monitor for any worsening symptoms. She may certainly benefit from cholinesterase inhibitors in the future but they have elected to decline any new medications on account that her cognitive deficits have been relatively stable. Anxiety is reported to be fairly well controlled. Deferred any other medications for insomnia. I do not feel strongly that any levodopa trials are warranted at this time as her parkinsonian features are fairly mild and not causing any significant functional limitations. There may also be a higher risk of adverse effects with dopaminergic medications particularly worsening psychotic symptoms. Discussed formal trials of physical therapy for gait and balance training in addition to low back pain. We will set this up locally. We will continue to monitor at this time and they are advised to contact us should symptoms change or worsen. Advised continued follow-up with [facility] geriatric medicine. We will see her back in 6 months. Signature: Sheyla Hargrove MD      Date:  3/8/2023    [facility]   [address] [place] [address]  Ph: [phone]  Fax: [phone]         I have personally interviewed and examined Mrs. Jesse Alonso and I have personally reviewed all relevant records including labs and imaging as noted above. I have written all aspects of this note. More than 50% of this time was used for counseling regarding my diagnosis, prognosis, and plans for management. Total visit time: 36 minutes.

## 2023-03-23 ENCOUNTER — HOSPITAL ENCOUNTER (OUTPATIENT)
Dept: PHYSICAL THERAPY | Age: 63
Setting detail: RECURRING SERIES
Discharge: HOME | End: 2023-03-26
Payer: COMMERCIAL

## 2023-03-23 PROCEDURE — 97162 PT EVAL MOD COMPLEX 30 MIN: CPT

## 2023-03-23 ASSESSMENT — PAIN DESCRIPTION - ORIENTATION: ORIENTATION: LOWER

## 2023-03-23 ASSESSMENT — PAIN SCALES - GENERAL: PAINLEVEL_OUTOF10: 4

## 2023-03-23 ASSESSMENT — PAIN DESCRIPTION - PAIN TYPE: TYPE: CHRONIC PAIN

## 2023-03-23 ASSESSMENT — PAIN DESCRIPTION - LOCATION: LOCATION: BACK

## 2023-03-23 ASSESSMENT — PAIN DESCRIPTION - DESCRIPTORS: DESCRIPTORS: ACHING;SORE;TIGHTNESS

## 2023-03-23 NOTE — THERAPY EVALUATION
taken by an individual to stand up from a standard arm chair (seat height 46 cm [18 in], arm height 65 cm [25.6 in]), walk a distance of 3 meters (118 in, approx 10 ft), turn, walk back to the chair and sit down.  If the individual takes longer than 14 seconds to complete TUG, this indicates risk for falls.    Outcome Measure:   Tool Used: 6-Minute Walk Test   Score:  Initial: TBD Feet =  Meters  Most Recent:  Feet =  Meters    Interpretation of Score:   AGE  GENDER  MEAN  SD  NORMAL RANGE (2SD)    60-69  Male (15)   Female (22)  572   538  92   92  [phone]   [phone]    70-79  Male (14)   Female (22)  527   471  85   75  [phone]   [phone]    80-89  Male (8)   Female (15)  [phone]   392  73   85  [phone]   [phone]             MEDICAL NECESSITY:   Patient is expected to demonstrate progress in strength, range of motion, balance, coordination, and functional technique to improve safety during all activities.  REASON FOR SERVICES/OTHER COMMENTS:  Patient continues to require skilled intervention due to back pain, poor posture, balance and gait deficit.    Total Duration:  Time In: 1115  Time Out: 1200    Regarding Tkia Veronica's therapy, I certify that the treatment plan above will be carried out by a therapist or under their direction.  Thank you for this referral,  Lisa Langley, PT     Referring Physician Signature: Barrera Aly MD                    Post Session Pain  Charge Capture  PT Visit Info MD Donna Minaya

## 2023-03-27 ENCOUNTER — HOSPITAL ENCOUNTER (OUTPATIENT)
Dept: PHYSICAL THERAPY | Age: 63
Setting detail: RECURRING SERIES
Discharge: HOME | End: 2023-03-30
Payer: COMMERCIAL

## 2023-03-27 PROCEDURE — 97110 THERAPEUTIC EXERCISES: CPT

## 2023-03-27 ASSESSMENT — PAIN SCALES - GENERAL: PAINLEVEL_OUTOF10: 5

## 2023-03-27 NOTE — PROGRESS NOTES
release capsule TAKE 1 CAPSULE BY MOUTH IN THE MORNING      HYDROcodone-acetaminophen (NORCO) 5-325 MG per tablet TAKE 1 TABLET BY MOUTH EVERY 8 HOURS AS NEEDED FOR PAIN IN PALLIATIVE CARE PATIENT      loperamide (IMODIUM) 2 MG capsule Take 2 mg by mouth 2 times daily      acetaminophen (TYLENOL) 500 MG tablet Take by mouth every 6 hours as needed      amLODIPine (NORVASC) 10 MG tablet TAKE ONE TABLET BY MOUTH ONE TIME DAILY      vitamin D 25 MCG (1000 UT) CAPS Take 1,000 Units by mouth daily      ferrous sulfate (IRON 325) 325 (65 Fe) MG tablet Take 325 mg by mouth      glimepiride (AMARYL) 1 MG tablet Take 1 mg by mouth 2 times daily      insulin glargine (LANTUS SOLOSTAR) 100 UNIT/ML injection pen Inject into the skin      metoprolol succinate (TOPROL XL) 25 MG extended release tablet Take 25 mg by mouth daily      ondansetron (ZOFRAN) 4 MG tablet Take 4 mg by mouth every 8 hours as needed      pantoprazole (PROTONIX) 40 MG tablet Take 40 mg by mouth 2 times daily      potassium gluconate 550 mg tablet Take 99 mg by mouth 2 times daily      traZODone (DESYREL) 50 MG tablet TAKE 1 TO 2 TABLETS BY MOUTH EVERY DAY AT BEDTIME AS NEEDED       No current facility-administered medications on file prior to encounter.        Medications Last Reviewed:  3/27/2023  Updated Objective Findings:  {New Findings:83570}  Treatment   {TREATMENTSOPPT:44030}  {Grids/Tables:96969}  Therapeutic Exercise/therapeutic Activity/ PWR!/LSVT BIG:   Date:   Date:   Activity/Exercise Parameters Parameters   Seated lateral reach /trunk elontation     Tilts difficilty      [employer] magnesium water gatoratd x 6 lots of sodium     ltr          Seated Floor to ceiling     Seated side to side     Finger flicks     Semi sit to stand with chair in front - forward leans     Sit to stand with hands clasped in front     Step and reach - forwards     Step and reach - sideways     Step and reach - backwards     Rock and reach - forwards/backwards

## 2023-03-29 ENCOUNTER — HOSPITAL ENCOUNTER (OUTPATIENT)
Dept: PHYSICAL THERAPY | Age: 63
Setting detail: RECURRING SERIES
Discharge: HOME | End: 2023-04-01
Payer: COMMERCIAL

## 2023-03-29 PROCEDURE — 97110 THERAPEUTIC EXERCISES: CPT

## 2023-03-29 ASSESSMENT — PAIN DESCRIPTION - LOCATION: LOCATION: HEAD

## 2023-03-29 ASSESSMENT — PAIN DESCRIPTION - PAIN TYPE: TYPE: ACUTE PAIN

## 2023-03-29 ASSESSMENT — PAIN SCALES - GENERAL: PAINLEVEL_OUTOF10: 1

## 2023-03-29 NOTE — PROGRESS NOTES
chair in front - forward leans     Sit to stand with hands clasped in front  yes   Step and reach - forwards     Step and reach - sideways     Step and reach - backwards     Rock and reach - [employer] and twist and reach - side to side                          Treatment/Session Summary:    >Treatment Assessment:  better alignement. Good understanding of BIGness.   Scooting and sit tostand       Communication/Consultation:  {Communication:96842}  Equipment provided today:  {None/Wildcard:00031}  Recommendations/Intent for next treatment session: Next visit will focus on ***.    >Total Treatment Billable Duration:  40 minutes  Time In: 1515  Time Out: [address], PT       Charge Capture  }Post Session Pain  PT Visit Info  [facility]  [address]    Future Appointments   Date Time Provider Haydee العراقي   4/3/2023  2:30 PM Michael, PT [facility]   4/5/2023  3:15 PM Michael, PT [facility]   4/12/2023  2:30 PM Michael, PT [facility]   4/14/2023 11:15 AM Michael, PT [facility]   4/17/2023  2:30 PM Michael, PT [facility]   4/19/2023  2:30 PM Michael, PT [facility]   4/24/2023  2:30 PM Michael, PT [facility]   4/26/2023  1:45 PM Michael, PT [facility]   5/16/2023  1:40 PM Josef [address] [place] [address]   5/16/2023  2:00 PM Lenny Rojo MD [facility]   9/11/2023  2:15 PM Rema Armstrong MD [facility]

## 2023-04-03 ENCOUNTER — HOSPITAL ENCOUNTER (OUTPATIENT)
Dept: PHYSICAL THERAPY | Age: 63
Setting detail: RECURRING SERIES
Discharge: HOME | End: 2023-04-06
Payer: MEDICARE

## 2023-04-03 PROCEDURE — 97110 THERAPEUTIC EXERCISES: CPT

## 2023-04-03 ASSESSMENT — PAIN SCALES - GENERAL: PAINLEVEL_OUTOF10: 0

## 2023-04-03 NOTE — PROGRESS NOTES
cues for big movement with excellent carryover. X10 with excellent ability and recall. Step and reach - forwards   X10 each side hold at elevated mat. Excellent BIGNESS   Step and reach - sideways      Step and reach - backwards      Rock and reach - [employer] and twist and reach - side to side      BIG walking   X2 laps. Good ability. No extinguishing. EDUCATION:  Instructed in above exercises and home program.  HEP:  Written HEP given to patient. Treatment/Session Summary:    >Treatment Assessment:  good recall of scooting. Good job overall with HEP. Very slow. Good forward step and walking after exercises. Overall back feels better  Communication/Consultation:  None today  Equipment provided today:  None  Recommendations/Intent for next treatment session: Next visit will focus on add one to two exercises per session until all are learned. Then move into more functional activity with radha Kauffman     >Total Treatment Billable Duration:  40 minutes  Time In: 1432  Time Out: Simone Lane PT       Charge Capture  }Post Session Pain  PT Visit Info  [address] Portal  MD Guidelines  Scanned Media  Benefits  MyChart    Future Appointments   Date Time Provider Haydee العراقي   4/5/2023  3:15 PM Lee Teague PTA [facility]   4/12/2023  2:30 PM Connie Valenitn, PT [facility]   4/14/2023 11:15 AM Connie Valentin, PT [facility]   4/17/2023  2:30 PM Connie Valentin, PT [facility]   4/19/2023  2:30 PM Connie Valentin, PT [facility]   4/24/2023  2:30 PM Connie Valentin, PT [facility]   4/26/2023  1:45 PM Connie Valentin, PT [facility]   5/16/2023  1:40 PM Josef Hutton [facility]   5/16/2023  2:00 PM Reza Da Silva MD [facility]   9/11/2023  2:15 PM Kellee Malhotra MD [facility]

## 2023-04-05 ENCOUNTER — HOSPITAL ENCOUNTER (OUTPATIENT)
Dept: PHYSICAL THERAPY | Age: 63
Setting detail: RECURRING SERIES
Discharge: HOME | End: 2023-04-08
Payer: MEDICARE

## 2023-04-05 PROCEDURE — 97110 THERAPEUTIC EXERCISES: CPT

## 2023-04-05 ASSESSMENT — PAIN SCALES - GENERAL: PAINLEVEL_OUTOF10: 0

## 2023-04-05 NOTE — PROGRESS NOTES
with cues for hands/arms    Step and reach - forwards   X10 each side hold at elevated mat. Excellent BIGNESS At counter   X 10   No recall on this one  X 5 again at end of session    Step and reach - sideways       Step and reach - backwards       Rock and reach - [employer] and twist and reach - side to side       BIG walking   X2 laps. Good ability. No extinguishing. Seated side to side reaching    Reviewed - and performed x 10 B          EDUCATION:  Instructed in above exercises and home program.  HEP:  Written HEP given to patient. Treatment/Session Summary:    >Treatment Assessment:  good recall of bridge and LTR today. She could not remember step forward. Floor to ceiling with highlighted words to  call out to help from holding breath. She does state that her back is better since she started therapy. Communication/Consultation:  None today  Equipment provided today:  None  Recommendations/Intent for next treatment session: Next visit will focus on add one to two exercises per session until all are learned. Then move into more functional activity with yasmeen. Campbell Aliza     >Total Treatment Billable Duration:  40 minutes  Time In: 5179  Time Out: 52270 Teo Rouse, ELIAZAR       Charge Capture  }Post Session Pain  PT Visit Info  [facility] Portal  MD Guidelines  Scanned Media  Benefits  MyChart    Future Appointments   Date Time Provider Haydee العراقي   4/12/2023  2:30 PM [facility]   4/14/2023 11:15 AM [place], [facility]   4/17/2023  2:30 PM [place], PT [facility]   4/19/2023  2:30 PM [place]   4/24/2023  2:30 PM [place]   4/26/2023  1:45 PM [place], PT [facility]   5/16/2023  [facility]   5/16/2023  2:00 PM Yolanda Randolph MD [facility]   9/11/2023  2:15 PM Tika Crowley MD [facility]

## 2023-04-14 ENCOUNTER — HOSPITAL ENCOUNTER (OUTPATIENT)
Dept: PHYSICAL THERAPY | Age: 63
Setting detail: RECURRING SERIES
Discharge: HOME | End: 2023-04-17
Payer: MEDICARE

## 2023-04-14 PROCEDURE — 97530 THERAPEUTIC ACTIVITIES: CPT

## 2023-04-14 ASSESSMENT — PAIN SCALES - GENERAL: PAINLEVEL_OUTOF10: 0

## 2023-04-17 ENCOUNTER — APPOINTMENT (OUTPATIENT)
Dept: PHYSICAL THERAPY | Age: 63
End: 2023-04-17
Payer: MEDICARE

## 2023-04-19 ENCOUNTER — HOSPITAL ENCOUNTER (OUTPATIENT)
Dept: PHYSICAL THERAPY | Age: 63
Setting detail: RECURRING SERIES
Discharge: HOME | End: 2023-04-22
Payer: MEDICARE

## 2023-04-19 PROCEDURE — 97110 THERAPEUTIC EXERCISES: CPT

## 2023-04-19 PROCEDURE — 97530 THERAPEUTIC ACTIVITIES: CPT

## 2023-04-19 ASSESSMENT — PAIN DESCRIPTION - LOCATION: LOCATION: BACK

## 2023-04-19 ASSESSMENT — PAIN SCALES - GENERAL: PAINLEVEL_OUTOF10: 3

## 2023-04-19 NOTE — THERAPY DISCHARGE
Mike Messina  : 1960  Primary: [facility]  Secondary:  [address] [facility]   Phone: [phone]  Fax: [phone] Plan Frequency: 2x/week  Plan of Care/Certification Expiration Date: 23      PT Visit Info: Total # of Visits Approved: 99  Total # of Visits to Date: 8  Progress Note Counter: 8  Progress Note Due Date: 23      Visit Count:  8    OUTPATIENT PHYSICAL THERAPY:OP NOTE TYPE: Discharge Summary 2023               Episode  Appt Desk         Treatment Diagnosis:  Unsteadiness on feet (R26.81)  Difficulty in walking, not elsewhere classified (R26.2)  Low back pain (M54.5); Abnormal posture (R29.3)    Medical/Referring Diagnosis:  Moderate Lewy body dementia with anxiety (HCC) [G31.83, F02. B4]  Gait disturbance [R26.9]  Chronic bilateral low back pain without sciatica [M54.50, G89.29]  Referring Physician:  Hilda Alvarado MD MD Orders:  PT Eval and Treat   Return MD Appt:  6 months  Date of Onset:      Allergies:  Sulfa antibiotics  Restrictions/Precautions:           Medications Last Reviewed:  2023     SUBJECTIVE   History of Injury/Illness (Reason for Referral):   60year old white female right handed. Low voice volume. \"See what you all can do to hlep me. Doctor saw me, my  and dog. Why I was hurting in my back and some trouble with my walking. Having trouble with doing the normal things in the house. One day I may be able to wash the dishes or make the beds or some days I may not have the strength to do the things I need to do. Lives with . 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 Patient Stated Goal(s):  \"See if I can do things better like I used to. Be more consistent with daily energy to do daily chores.   We offer a place at our [facility] for the missionaries and ant to be able to clean up instead of having them going to a

## 2023-04-19 NOTE — PROGRESS NOTES
Seda Christian  : 1960  Primary: [employer]  Secondary:  Staci Roahc  [address] [facility] [address] [address]  [address]  Phone: [phone]  Fax: [phone] Plan Frequency: 2x/week    Plan of Care/Certification Expiration Date: 23      >PT Visit Info:  Plan Frequency: 2x/week  Plan of Care/Certification Expiration Date: 23  Total # of Visits Approved: 99  Total # of Visits to Date: 8  Progress Note Counter: 8  Progress Note Due Date: 23      Visit Count:  8    OUTPATIENT PHYSICAL THERAPY:OP NOTE TYPE: Treatment Note 2023       Episode  }Appt Desk             Treatment Diagnosis:  Unsteadiness on feet (R26.81)  Difficulty in walking, not elsewhere classified (R26.2)  Low back pain (M54.5); Abnormal posture (R29.3)  Medical/Referring Diagnosis:  Moderate Lewy body dementia with anxiety (HCC) [G31.83, F02. B4]  Gait disturbance [R26.9]  Chronic bilateral low back pain without sciatica [M54.50, G89.29]  Referring Physician:  Daksha Robertson MD MD Orders:  PT Eval and Treat   Date of Onset:  No data recorded   Allergies:   Sulfa antibiotics  Restrictions/Precautions:  No data recorded  No data recorded   Interventions Planned (Treatment may consist of any combination of the following):    Current Treatment Recommendations: Strengthening; ROM; Balance training; Functional mobility training; Transfer training; Endurance training; Gait training; Stair training; Neuromuscular re-education; Manual; Pain management; Home exercise program; Safety education & training; Patient/Caregiver education & training; Modalities; Positioning; Therapeutic activities; Vestibular rehab     >Subjective Comments:  . My voice is hoarse. Had endoscope on monday. Cant find out what's causing it. stomach problems  >Initial:   Back 3/10>Post Session:     Back 3/10  leg cramps this morning. Takes magnesium. Maybe not enough water.       Medications Last Reviewed:

## 2023-04-21 ENCOUNTER — APPOINTMENT (OUTPATIENT)
Dept: PHYSICAL THERAPY | Age: 63
End: 2023-04-21
Payer: MEDICARE

## 2023-04-24 ENCOUNTER — APPOINTMENT (OUTPATIENT)
Dept: PHYSICAL THERAPY | Age: 63
End: 2023-04-24
Payer: MEDICARE

## 2023-04-26 ENCOUNTER — APPOINTMENT (OUTPATIENT)
Dept: PHYSICAL THERAPY | Age: 63
End: 2023-04-26
Payer: MEDICARE

## 2023-04-27 DIAGNOSIS — N60.91 ATYPICAL DUCTAL HYPERPLASIA OF BOTH BREASTS: Primary | ICD-10-CM

## 2023-04-27 DIAGNOSIS — N60.92 ATYPICAL DUCTAL HYPERPLASIA OF BOTH BREASTS: Primary | ICD-10-CM

## 2023-05-16 ENCOUNTER — HOSPITAL ENCOUNTER (OUTPATIENT)
Dept: LAB | Age: 63
Discharge: HOME | End: 2023-05-19
Payer: MEDICARE

## 2023-05-16 ENCOUNTER — OFFICE VISIT (OUTPATIENT)
Dept: ONCOLOGY | Age: 63
End: 2023-05-16

## 2023-05-16 VITALS
TEMPERATURE: 98.7 F | DIASTOLIC BLOOD PRESSURE: 74 MMHG | HEART RATE: 76 BPM | OXYGEN SATURATION: 94 % | RESPIRATION RATE: 14 BRPM | WEIGHT: 148.4 LBS | HEIGHT: 65 IN | BODY MASS INDEX: 24.72 KG/M2 | SYSTOLIC BLOOD PRESSURE: 128 MMHG

## 2023-05-16 DIAGNOSIS — N60.91 ATYPICAL DUCTAL HYPERPLASIA OF BOTH BREASTS: Primary | ICD-10-CM

## 2023-05-16 DIAGNOSIS — N60.91 ATYPICAL DUCTAL HYPERPLASIA OF BOTH BREASTS: ICD-10-CM

## 2023-05-16 DIAGNOSIS — R60.9 SWELLING: Primary | ICD-10-CM

## 2023-05-16 DIAGNOSIS — R60.9 SWELLING: ICD-10-CM

## 2023-05-16 DIAGNOSIS — N60.92 ATYPICAL DUCTAL HYPERPLASIA OF BOTH BREASTS: Primary | ICD-10-CM

## 2023-05-16 DIAGNOSIS — N60.92 ATYPICAL DUCTAL HYPERPLASIA OF BOTH BREASTS: ICD-10-CM

## 2023-05-16 DIAGNOSIS — Z12.31 ENCOUNTER FOR SCREENING MAMMOGRAM FOR BREAST CANCER: ICD-10-CM

## 2023-05-16 LAB
ALBUMIN SERPL-MCNC: 3.6 G/DL (ref 3.2–4.6)
ALBUMIN/GLOB SERPL: 0.7 (ref 0.4–1.6)
ALP SERPL-CCNC: 80 U/L (ref 50–136)
ALT SERPL-CCNC: 36 U/L (ref 12–65)
ANION GAP SERPL CALC-SCNC: 5 MMOL/L (ref 2–11)
AST SERPL-CCNC: 38 U/L (ref 15–37)
BASOPHILS # BLD: 0.1 K/UL (ref 0–0.2)
BASOPHILS NFR BLD: 1 % (ref 0–2)
BILIRUB SERPL-MCNC: 0.2 MG/DL (ref 0.2–1.1)
BUN SERPL-MCNC: 17 MG/DL (ref 8–23)
CALCIUM SERPL-MCNC: 9.5 MG/DL (ref 8.3–10.4)
CHLORIDE SERPL-SCNC: 101 MMOL/L (ref 101–110)
CO2 SERPL-SCNC: 30 MMOL/L (ref 21–32)
CREAT SERPL-MCNC: 1 MG/DL (ref 0.6–1)
DIFFERENTIAL METHOD BLD: ABNORMAL
EOSINOPHIL # BLD: 0.1 K/UL (ref 0–0.8)
EOSINOPHIL NFR BLD: 2 % (ref 0.5–7.8)
ERYTHROCYTE [DISTWIDTH] IN BLOOD BY AUTOMATED COUNT: 14.8 % (ref 11.9–14.6)
GLOBULIN SER CALC-MCNC: 5.4 G/DL (ref 2.8–4.5)
GLUCOSE SERPL-MCNC: 136 MG/DL (ref 65–100)
HCT VFR BLD AUTO: 38 % (ref 35.8–46.3)
HGB BLD-MCNC: 12.4 G/DL (ref 11.7–15.4)
IMM GRANULOCYTES # BLD AUTO: 0 K/UL (ref 0–0.5)
IMM GRANULOCYTES NFR BLD AUTO: 1 % (ref 0–5)
LYMPHOCYTES # BLD: 3.2 K/UL (ref 0.5–4.6)
LYMPHOCYTES NFR BLD: 46 % (ref 13–44)
MCH RBC QN AUTO: 28.7 PG (ref 26.1–32.9)
MCHC RBC AUTO-ENTMCNC: 32.6 G/DL (ref 31.4–35)
MCV RBC AUTO: 88 FL (ref 82–102)
MONOCYTES # BLD: 0.4 K/UL (ref 0.1–1.3)
MONOCYTES NFR BLD: 6 % (ref 4–12)
NEUTS SEG # BLD: 3.2 K/UL (ref 1.7–8.2)
NEUTS SEG NFR BLD: 45 % (ref 43–78)
NRBC # BLD: 0 K/UL (ref 0–0.2)
PLATELET # BLD AUTO: 263 K/UL (ref 150–450)
PMV BLD AUTO: 10 FL (ref 9.4–12.3)
POTASSIUM SERPL-SCNC: 4.2 MMOL/L (ref 3.5–5.1)
PROT SERPL-MCNC: 9 G/DL (ref 6.3–8.2)
RBC # BLD AUTO: 4.32 M/UL (ref 4.05–5.2)
SODIUM SERPL-SCNC: 136 MMOL/L (ref 133–143)
WBC # BLD AUTO: 7 K/UL (ref 4.3–11.1)

## 2023-05-16 PROCEDURE — 85025 COMPLETE CBC W/AUTO DIFF WBC: CPT

## 2023-05-16 PROCEDURE — 36415 COLL VENOUS BLD VENIPUNCTURE: CPT

## 2023-05-16 PROCEDURE — 80053 COMPREHEN METABOLIC PANEL: CPT

## 2023-05-16 ASSESSMENT — PATIENT HEALTH QUESTIONNAIRE - PHQ9
SUM OF ALL RESPONSES TO PHQ QUESTIONS 1-9: 0
SUM OF ALL RESPONSES TO PHQ QUESTIONS 1-9: 0
SUM OF ALL RESPONSES TO PHQ9 QUESTIONS 1 & 2: 0
SUM OF ALL RESPONSES TO PHQ QUESTIONS 1-9: 0
1. LITTLE INTEREST OR PLEASURE IN DOING THINGS: 0
SUM OF ALL RESPONSES TO PHQ QUESTIONS 1-9: 0
2. FEELING DOWN, DEPRESSED OR HOPELESS: 0

## 2023-05-16 NOTE — PROGRESS NOTES
mouth every 6 hours as needed      amLODIPine (NORVASC) 10 MG tablet TAKE ONE TABLET BY MOUTH ONE TIME DAILY      vitamin D 25 MCG (1000 UT) CAPS Take 1 capsule by mouth daily      ferrous sulfate (IRON 325) 325 (65 Fe) MG tablet Take 1 tablet by mouth      glimepiride (AMARYL) 1 MG tablet Take 1 tablet by mouth 2 times daily      insulin glargine (LANTUS SOLOSTAR) 100 UNIT/ML injection pen Inject into the skin      metoprolol succinate (TOPROL XL) 25 MG extended release tablet Take 1 tablet by mouth daily      ondansetron (ZOFRAN) 4 MG tablet Take 1 tablet by mouth every 8 hours as needed      pantoprazole (PROTONIX) 40 MG tablet Take 1 tablet by mouth 2 times daily      potassium gluconate 550 mg tablet Take 99 mg by mouth 2 times daily      traZODone (DESYREL) 50 MG tablet TAKE 1 TO 2 TABLETS BY MOUTH EVERY DAY AT BEDTIME AS NEEDED       No current facility-administered medications for this visit. Social History     Socioeconomic History    Marital status:      Spouse name: None    Number of children: None    Years of education: None    Highest education level: None   Tobacco Use    Smoking status: Never    Smokeless tobacco: Never   Substance and Sexual Activity    Alcohol use: Not Currently    Drug use: Never       Family History   Problem Relation Age of Onset    Heart Attack Maternal Grandfather     Cancer Other         maternal side     Hypertension Mother     Breast Cancer Paternal Aunt     Breast Cancer Maternal Aunt        Allergies   Allergen Reactions    Sulfa Antibiotics Itching       PHYSICAL EXAMINATION:  General Appearance: Healthy appearing patient in no acute distress. Vitals reviewed. /74   Pulse 76   Temp 98.7 °F (37.1 °C) (Oral)   Resp 14   Ht 5' 5\" (1.651 m)   Wt 148 lb 6.4 oz (67.3 kg)   SpO2 94%   BMI 24.70 kg/m²   HEENT: No oral or pharyngeal masses, ulceration or thrush noted, no sinus tenderness. Neck is supple with no thyromegaly or JVD noted.   Lymph Nodes:

## 2023-05-17 ENCOUNTER — HOSPITAL ENCOUNTER (OUTPATIENT)
Dept: ULTRASOUND IMAGING | Age: 63
Discharge: HOME | End: 2023-05-20

## 2023-05-17 DIAGNOSIS — R60.9 SWELLING: ICD-10-CM

## 2023-05-18 ENCOUNTER — HOSPITAL ENCOUNTER (OUTPATIENT)
Dept: MAMMOGRAPHY | Age: 63
Discharge: HOME | End: 2023-05-18
Payer: MEDICARE

## 2023-05-18 DIAGNOSIS — Z12.31 ENCOUNTER FOR SCREENING MAMMOGRAM FOR BREAST CANCER: ICD-10-CM

## 2023-05-18 PROCEDURE — 77067 SCR MAMMO BI INCL CAD: CPT

## 2023-09-11 ENCOUNTER — OFFICE VISIT (OUTPATIENT)
Dept: NEUROLOGY | Age: 63
End: 2023-09-11
Payer: MEDICARE

## 2023-09-11 VITALS
HEART RATE: 84 BPM | SYSTOLIC BLOOD PRESSURE: 146 MMHG | OXYGEN SATURATION: 94 % | BODY MASS INDEX: 24.63 KG/M2 | WEIGHT: 148 LBS | DIASTOLIC BLOOD PRESSURE: 75 MMHG

## 2023-09-11 DIAGNOSIS — F51.01 PRIMARY INSOMNIA: ICD-10-CM

## 2023-09-11 DIAGNOSIS — M54.50 CHRONIC BILATERAL LOW BACK PAIN WITHOUT SCIATICA: ICD-10-CM

## 2023-09-11 DIAGNOSIS — G89.29 CHRONIC BILATERAL LOW BACK PAIN WITHOUT SCIATICA: ICD-10-CM

## 2023-09-11 DIAGNOSIS — F02.B4 MODERATE LEWY BODY DEMENTIA WITH ANXIETY (HCC): Primary | ICD-10-CM

## 2023-09-11 DIAGNOSIS — R26.9 GAIT DISTURBANCE: ICD-10-CM

## 2023-09-11 DIAGNOSIS — G47.52 RBD (REM BEHAVIORAL DISORDER): ICD-10-CM

## 2023-09-11 DIAGNOSIS — G31.83 MODERATE LEWY BODY DEMENTIA WITH ANXIETY (HCC): Primary | ICD-10-CM

## 2023-09-11 DIAGNOSIS — F41.9 ANXIETY: ICD-10-CM

## 2023-09-11 PROCEDURE — 99214 OFFICE O/P EST MOD 30 MIN: CPT | Performed by: PSYCHIATRY & NEUROLOGY

## 2023-09-11 RX ORDER — MELOXICAM 15 MG/1
15 TABLET ORAL DAILY
COMMUNITY
Start: 2023-06-15

## 2023-09-11 RX ORDER — DONEPEZIL HYDROCHLORIDE 10 MG/1
10 TABLET, FILM COATED ORAL
COMMUNITY
Start: 2023-08-23

## 2023-09-11 RX ORDER — GABAPENTIN 400 MG/1
CAPSULE ORAL
COMMUNITY
Start: 2023-09-07

## 2023-09-11 ASSESSMENT — PATIENT HEALTH QUESTIONNAIRE - PHQ9
SUM OF ALL RESPONSES TO PHQ QUESTIONS 1-9: 0
DEPRESSION UNABLE TO ASSESS: FUNCTIONAL CAPACITY MOTIVATION LIMITS ACCURACY
1. LITTLE INTEREST OR PLEASURE IN DOING THINGS: 0
SUM OF ALL RESPONSES TO PHQ QUESTIONS 1-9: 0
SUM OF ALL RESPONSES TO PHQ9 QUESTIONS 1 & 2: 0
2. FEELING DOWN, DEPRESSED OR HOPELESS: 0
SUM OF ALL RESPONSES TO PHQ QUESTIONS 1-9: 0
SUM OF ALL RESPONSES TO PHQ QUESTIONS 1-9: 0

## 2023-09-11 ASSESSMENT — ENCOUNTER SYMPTOMS
ABDOMINAL PAIN: 0
COUGH: 0
VOICE CHANGE: 1

## 2023-09-11 NOTE — PATIENT INSTRUCTIONS
Consider melatonin to help with sleep. A good starting dose would be 3-5 mg at night. You can take it with the trazodone.

## 2023-09-11 NOTE — PROGRESS NOTES
reported to be relatively stable. It does appear that many of the psychotic symptoms were worse late last year during a time in which she had multiple hospitalizations and can recur in the setting of acute illness or hospitalization. We do need to continue to monitor for any worsening symptoms. Lewy body dementia: Continue donepezil 10 mg at night. Overall things are reported to be relatively stable. Gait disturbance: With some features of parkinsonism also complicated by chronic low back pain. Discussed the role of exercise with respect to maintaining functional mobility and potentially delaying disease progression. Counseled on fall precautions. Trials of levodopa could be considered in the future should parkinsonian features worsen. Primary insomnia/RBD: Continue trazodone 50 to 100 mg at night and we also discussed initiation of melatonin for RBD. Generalized anxiety: Reported to be well controlled at the current time. Chronic low back pain: Discussed repeat referrals to physical therapy in the future. She will continue gabapentin as prescribed. We will see her back in 6 months. Signature: Racquel Mejia MD      Date:  9/14/2023    [facility]   [address] [facility], [address] [place] [address]  Ph: [phone]  Fax: [phone]         I have personally interviewed and examined Mrs. Trice Cervantes and I have personally reviewed all relevant records including labs and imaging as noted above. I have written all aspects of this note. More than 50% of this time was used for counseling regarding my diagnosis, prognosis, and plans for management. Total visit time: 36 minutes.

## 2023-11-02 DIAGNOSIS — R53.83 FATIGUE, UNSPECIFIED TYPE: ICD-10-CM

## 2023-11-02 DIAGNOSIS — Z12.31 ENCOUNTER FOR SCREENING MAMMOGRAM FOR BREAST CANCER: Primary | ICD-10-CM

## 2023-11-02 DIAGNOSIS — N60.91 ATYPICAL DUCTAL HYPERPLASIA OF BOTH BREASTS: ICD-10-CM

## 2023-11-02 DIAGNOSIS — N60.92 ATYPICAL DUCTAL HYPERPLASIA OF BOTH BREASTS: ICD-10-CM

## (undated) DEVICE — BIT DRL DIA2.9MM DISP FOR SFT SUT ANCHR JUGGERKNOT

## (undated) DEVICE — SOFT SILICONE HYDROCELLULAR FOAM DRESSING WITH LOCK AWAY LAYER: Brand: ALLEVYN LIFE XL 21X21 CTN10

## (undated) DEVICE — GARMENT,MEDLINE,DVT,INT,CALF,MED, GEN2: Brand: MEDLINE

## (undated) DEVICE — SET IRRIG W 96IN TBNG 4 LN FLX BG

## (undated) DEVICE — SOLUTION IRRIG 3000ML 0.9% SOD CHL FLX CONT 0797208] ICU MEDICAL INC]

## (undated) DEVICE — STOCKINETTE,IMPERVIOUS,12X48,STERILE: Brand: MEDLINE

## (undated) DEVICE — INTENT TO BE USED WITH SUTURE MATERIAL FOR TISSUE CLOSURE: Brand: RICHARD-ALLAN®  NEEDLE 1/2 CIRCLE REVERSE CUTTING

## (undated) DEVICE — INTENDED FOR TISSUE SEPARATION, AND OTHER PROCEDURES THAT REQUIRE A SHARP SURGICAL BLADE TO PUNCTURE OR CUT.: Brand: BARD-PARKER SAFETY BLADES SIZE 11, STERILE

## (undated) DEVICE — GAUZE,SPONGE,8"X4",12PLY,XRAY,STRL,LF: Brand: MEDLINE

## (undated) DEVICE — BLADE SHV CUT MENIS AGG + 4MM --

## (undated) DEVICE — Device

## (undated) DEVICE — 90-S ACCELERATOR, SUCTION PROBE, NON-BENDABLE, MAX CUT LEVEL 11: Brand: SERFAS ENERGY

## (undated) DEVICE — [AGGRESSIVE 6-FLUTE ROUND BUR, ARTHROSCOPIC SHAVER BLADE,  DO NOT RESTERILIZE,  DO NOT USE IF PACKAGE IS DAMAGED,  KEEP DRY,  KEEP AWAY FROM SUNLIGHT]: Brand: FORMULA

## (undated) DEVICE — SUTURE MCRYL SZ 3-0 L27IN ABSRB UD L19MM PS-2 3/8 CIR PRIM Y427H

## (undated) DEVICE — STRIP,CLOSURE,WOUND,MEDI-STRIP,1/2X4: Brand: MEDLINE

## (undated) DEVICE — RESTRAINT UNIVERSAL HEAD DISP --

## (undated) DEVICE — INTENDED FOR TISSUE SEPARATION, AND OTHER PROCEDURES THAT REQUIRE A SHARP SURGICAL BLADE TO PUNCTURE OR CUT.: Brand: BARD-PARKER SAFETY BLADES SIZE 15, STERILE

## (undated) DEVICE — SET IRRIG DST FLX M CONN

## (undated) DEVICE — SURGICAL PROCEDURE PACK BASIC ST FRANCIS

## (undated) DEVICE — 3M™ COBAN™ SELF-ADHERENT WRAP, 1586S, STERILE, 6 IN X 5 YD (15 CM X 4,5 M), 12 ROLLS/CASE: Brand: 3M™ COBAN™

## (undated) DEVICE — KIT CHAIR TRIMANO FOAM W/ SUPP ARM DRP ERGONOMICALLY DESIGNED

## (undated) DEVICE — DRAPE,SHOULDER,BEACH CHAIR,STERILE: Brand: MEDLINE

## (undated) DEVICE — PREP SKN CHLRAPRP APL 26ML STR --

## (undated) DEVICE — BUTTON SWITCH PENCIL BLADE ELECTRODE, HOLSTER: Brand: EDGE

## (undated) DEVICE — REM POLYHESIVE ADULT PATIENT RETURN ELECTRODE: Brand: VALLEYLAB

## (undated) DEVICE — DRAPE,U/SHT,SPLIT,FILM,60X84,STERILE: Brand: MEDLINE

## (undated) DEVICE — [AGGRESSIVE 6-FLUTE BARREL BUR, ARTHROSCOPIC SHAVER BLADE,  DO NOT RESTERILIZE,  DO NOT USE IF PACKAGE IS DAMAGED,  KEEP DRY,  KEEP AWAY FROM SUNLIGHT]: Brand: FORMULA

## (undated) DEVICE — GOWN,SIRUS,NONRNF,SETINSLV,XL,20/CS: Brand: MEDLINE